# Patient Record
Sex: FEMALE | Race: WHITE | Employment: OTHER | URBAN - METROPOLITAN AREA
[De-identification: names, ages, dates, MRNs, and addresses within clinical notes are randomized per-mention and may not be internally consistent; named-entity substitution may affect disease eponyms.]

---

## 2017-03-02 ENCOUNTER — ALLSCRIPTS OFFICE VISIT (OUTPATIENT)
Dept: OTHER | Facility: OTHER | Age: 82
End: 2017-03-02

## 2017-03-04 ENCOUNTER — APPOINTMENT (EMERGENCY)
Dept: RADIOLOGY | Facility: HOSPITAL | Age: 82
DRG: 203 | End: 2017-03-04
Payer: MEDICARE

## 2017-03-04 ENCOUNTER — HOSPITAL ENCOUNTER (INPATIENT)
Facility: HOSPITAL | Age: 82
LOS: 8 days | Discharge: HOME HEALTH | DRG: 203 | End: 2017-03-12
Attending: EMERGENCY MEDICINE | Admitting: STUDENT IN AN ORGANIZED HEALTH CARE EDUCATION/TRAINING PROGRAM
Payer: MEDICARE

## 2017-03-04 DIAGNOSIS — J45.901 ASTHMA EXACERBATION: Primary | ICD-10-CM

## 2017-03-04 DIAGNOSIS — R09.02 HYPOXIA: ICD-10-CM

## 2017-03-04 PROBLEM — I10 HTN (HYPERTENSION): Status: ACTIVE | Noted: 2017-03-04

## 2017-03-04 PROBLEM — F32.A DEPRESSION: Status: ACTIVE | Noted: 2017-03-04

## 2017-03-04 LAB
ALBUMIN SERPL BCP-MCNC: 3.8 G/DL (ref 3.5–5)
ALP SERPL-CCNC: 79 U/L (ref 46–116)
ALT SERPL W P-5'-P-CCNC: 31 U/L (ref 12–78)
ANION GAP SERPL CALCULATED.3IONS-SCNC: 8 MMOL/L (ref 4–13)
APTT PPP: 30 SECONDS (ref 24–36)
AST SERPL W P-5'-P-CCNC: 26 U/L (ref 5–45)
BASOPHILS # BLD AUTO: 0 THOUSANDS/ΜL (ref 0–0.1)
BASOPHILS NFR BLD AUTO: 1 % (ref 0–1)
BILIRUB SERPL-MCNC: 0.5 MG/DL (ref 0.2–1)
BUN SERPL-MCNC: 12 MG/DL (ref 5–25)
CALCIUM SERPL-MCNC: 9.6 MG/DL (ref 8.3–10.1)
CHLORIDE SERPL-SCNC: 100 MMOL/L (ref 100–108)
CO2 SERPL-SCNC: 31 MMOL/L (ref 21–32)
CREAT SERPL-MCNC: 0.78 MG/DL (ref 0.6–1.3)
EOSINOPHIL # BLD AUTO: 0.1 THOUSAND/ΜL (ref 0–0.61)
EOSINOPHIL NFR BLD AUTO: 2 % (ref 0–6)
ERYTHROCYTE [DISTWIDTH] IN BLOOD BY AUTOMATED COUNT: 13.9 % (ref 11.6–15.1)
GFR SERPL CREATININE-BSD FRML MDRD: >60 ML/MIN/1.73SQ M
GLUCOSE SERPL-MCNC: 128 MG/DL (ref 65–140)
HCT VFR BLD AUTO: 47.3 % (ref 37–47)
HGB BLD-MCNC: 15.5 G/DL (ref 12–16)
INR PPP: 1.05 (ref 0.86–1.16)
LACTATE SERPL-SCNC: 1.4 MMOL/L (ref 0.5–2)
LYMPHOCYTES # BLD AUTO: 1.2 THOUSANDS/ΜL (ref 0.6–4.47)
LYMPHOCYTES NFR BLD AUTO: 21 % (ref 14–44)
MCH RBC QN AUTO: 29.7 PG (ref 27–31)
MCHC RBC AUTO-ENTMCNC: 32.7 G/DL (ref 31.4–37.4)
MCV RBC AUTO: 91 FL (ref 82–98)
MONOCYTES # BLD AUTO: 0.4 THOUSAND/ΜL (ref 0.17–1.22)
MONOCYTES NFR BLD AUTO: 7 % (ref 4–12)
NEUTROPHILS # BLD AUTO: 4 THOUSANDS/ΜL (ref 1.85–7.62)
NEUTS SEG NFR BLD AUTO: 69 % (ref 43–75)
NRBC BLD AUTO-RTO: 0 /100 WBCS
NT-PROBNP SERPL-MCNC: 135 PG/ML
PLATELET # BLD AUTO: 158 THOUSANDS/UL (ref 130–400)
PLATELET # BLD AUTO: 172 THOUSANDS/UL (ref 130–400)
PMV BLD AUTO: 7.5 FL (ref 8.9–12.7)
PMV BLD AUTO: 7.7 FL (ref 8.9–12.7)
POTASSIUM SERPL-SCNC: 3.7 MMOL/L (ref 3.5–5.3)
PROT SERPL-MCNC: 7.9 G/DL (ref 6.4–8.2)
PROTHROMBIN TIME: 11 SECONDS (ref 9.4–11.7)
RBC # BLD AUTO: 5.22 MILLION/UL (ref 4.2–5.4)
SODIUM SERPL-SCNC: 139 MMOL/L (ref 136–145)
TROPONIN I SERPL-MCNC: <0.02 NG/ML
TROPONIN I SERPL-MCNC: <0.02 NG/ML
WBC # BLD AUTO: 5.8 THOUSAND/UL (ref 4.8–10.8)

## 2017-03-04 PROCEDURE — 93005 ELECTROCARDIOGRAM TRACING: CPT | Performed by: EMERGENCY MEDICINE

## 2017-03-04 PROCEDURE — 84484 ASSAY OF TROPONIN QUANT: CPT | Performed by: EMERGENCY MEDICINE

## 2017-03-04 PROCEDURE — 80053 COMPREHEN METABOLIC PANEL: CPT | Performed by: EMERGENCY MEDICINE

## 2017-03-04 PROCEDURE — 96375 TX/PRO/DX INJ NEW DRUG ADDON: CPT

## 2017-03-04 PROCEDURE — 85049 AUTOMATED PLATELET COUNT: CPT | Performed by: STUDENT IN AN ORGANIZED HEALTH CARE EDUCATION/TRAINING PROGRAM

## 2017-03-04 PROCEDURE — 87798 DETECT AGENT NOS DNA AMP: CPT | Performed by: EMERGENCY MEDICINE

## 2017-03-04 PROCEDURE — 83880 ASSAY OF NATRIURETIC PEPTIDE: CPT | Performed by: EMERGENCY MEDICINE

## 2017-03-04 PROCEDURE — 85730 THROMBOPLASTIN TIME PARTIAL: CPT | Performed by: EMERGENCY MEDICINE

## 2017-03-04 PROCEDURE — 87040 BLOOD CULTURE FOR BACTERIA: CPT | Performed by: EMERGENCY MEDICINE

## 2017-03-04 PROCEDURE — 83605 ASSAY OF LACTIC ACID: CPT | Performed by: EMERGENCY MEDICINE

## 2017-03-04 PROCEDURE — 94640 AIRWAY INHALATION TREATMENT: CPT

## 2017-03-04 PROCEDURE — 36415 COLL VENOUS BLD VENIPUNCTURE: CPT | Performed by: EMERGENCY MEDICINE

## 2017-03-04 PROCEDURE — 99285 EMERGENCY DEPT VISIT HI MDM: CPT

## 2017-03-04 PROCEDURE — 94760 N-INVAS EAR/PLS OXIMETRY 1: CPT

## 2017-03-04 PROCEDURE — 84484 ASSAY OF TROPONIN QUANT: CPT | Performed by: STUDENT IN AN ORGANIZED HEALTH CARE EDUCATION/TRAINING PROGRAM

## 2017-03-04 PROCEDURE — 85025 COMPLETE CBC W/AUTO DIFF WBC: CPT | Performed by: EMERGENCY MEDICINE

## 2017-03-04 PROCEDURE — 96365 THER/PROPH/DIAG IV INF INIT: CPT

## 2017-03-04 PROCEDURE — 94664 DEMO&/EVAL PT USE INHALER: CPT

## 2017-03-04 PROCEDURE — 85610 PROTHROMBIN TIME: CPT | Performed by: EMERGENCY MEDICINE

## 2017-03-04 PROCEDURE — 87081 CULTURE SCREEN ONLY: CPT | Performed by: STUDENT IN AN ORGANIZED HEALTH CARE EDUCATION/TRAINING PROGRAM

## 2017-03-04 PROCEDURE — 71010 HB CHEST X-RAY 1 VIEW FRONTAL (PORTABLE): CPT

## 2017-03-04 RX ORDER — LANOLIN ALCOHOL/MO/W.PET/CERES
400 CREAM (GRAM) TOPICAL DAILY
Status: DISCONTINUED | OUTPATIENT
Start: 2017-03-05 | End: 2017-03-12 | Stop reason: HOSPADM

## 2017-03-04 RX ORDER — METHYLPREDNISOLONE SODIUM SUCCINATE 125 MG/2ML
125 INJECTION, POWDER, LYOPHILIZED, FOR SOLUTION INTRAMUSCULAR; INTRAVENOUS ONCE
Status: COMPLETED | OUTPATIENT
Start: 2017-03-04 | End: 2017-03-04

## 2017-03-04 RX ORDER — AZITHROMYCIN 250 MG/1
500 TABLET, FILM COATED ORAL EVERY 24 HOURS
Status: DISCONTINUED | OUTPATIENT
Start: 2017-03-05 | End: 2017-03-09

## 2017-03-04 RX ORDER — METHYLPREDNISOLONE SODIUM SUCCINATE 125 MG/2ML
60 INJECTION, POWDER, LYOPHILIZED, FOR SOLUTION INTRAMUSCULAR; INTRAVENOUS EVERY 8 HOURS
Status: DISCONTINUED | OUTPATIENT
Start: 2017-03-05 | End: 2017-03-06

## 2017-03-04 RX ORDER — LEVALBUTEROL INHALATION SOLUTION 0.63 MG/3ML
0.63 SOLUTION RESPIRATORY (INHALATION) EVERY 8 HOURS PRN
Status: DISCONTINUED | OUTPATIENT
Start: 2017-03-04 | End: 2017-03-05

## 2017-03-04 RX ORDER — SERTRALINE HYDROCHLORIDE 25 MG/1
25 TABLET, FILM COATED ORAL DAILY
Status: DISCONTINUED | OUTPATIENT
Start: 2017-03-05 | End: 2017-03-12 | Stop reason: HOSPADM

## 2017-03-04 RX ORDER — HEPARIN SODIUM 5000 [USP'U]/ML
5000 INJECTION, SOLUTION INTRAVENOUS; SUBCUTANEOUS EVERY 8 HOURS SCHEDULED
Status: DISCONTINUED | OUTPATIENT
Start: 2017-03-04 | End: 2017-03-12 | Stop reason: HOSPADM

## 2017-03-04 RX ORDER — AMLODIPINE BESYLATE 5 MG/1
5 TABLET ORAL DAILY
Status: DISCONTINUED | OUTPATIENT
Start: 2017-03-05 | End: 2017-03-12 | Stop reason: HOSPADM

## 2017-03-04 RX ORDER — VALSARTAN 80 MG/1
80 TABLET ORAL 2 TIMES DAILY
Status: DISCONTINUED | OUTPATIENT
Start: 2017-03-04 | End: 2017-03-12 | Stop reason: HOSPADM

## 2017-03-04 RX ORDER — IPRATROPIUM BROMIDE AND ALBUTEROL SULFATE 2.5; .5 MG/3ML; MG/3ML
3 SOLUTION RESPIRATORY (INHALATION)
Status: DISCONTINUED | OUTPATIENT
Start: 2017-03-04 | End: 2017-03-05

## 2017-03-04 RX ORDER — MONTELUKAST SODIUM 10 MG/1
10 TABLET ORAL
Status: DISCONTINUED | OUTPATIENT
Start: 2017-03-04 | End: 2017-03-12 | Stop reason: HOSPADM

## 2017-03-04 RX ORDER — FLUTICASONE PROPIONATE 50 MCG
1 SPRAY, SUSPENSION (ML) NASAL DAILY
Status: DISCONTINUED | OUTPATIENT
Start: 2017-03-05 | End: 2017-03-12 | Stop reason: HOSPADM

## 2017-03-04 RX ORDER — LEVALBUTEROL INHALATION SOLUTION 0.31 MG/3ML
1 SOLUTION RESPIRATORY (INHALATION) EVERY 4 HOURS PRN
COMMUNITY
End: 2017-03-12 | Stop reason: HOSPADM

## 2017-03-04 RX ORDER — IPRATROPIUM BROMIDE AND ALBUTEROL SULFATE 2.5; .5 MG/3ML; MG/3ML
3 SOLUTION RESPIRATORY (INHALATION) ONCE
Status: COMPLETED | OUTPATIENT
Start: 2017-03-04 | End: 2017-03-04

## 2017-03-04 RX ADMIN — VALSARTAN 80 MG: 80 TABLET ORAL at 21:32

## 2017-03-04 RX ADMIN — METHYLPREDNISOLONE SODIUM SUCCINATE 125 MG: 125 INJECTION, POWDER, FOR SOLUTION INTRAMUSCULAR; INTRAVENOUS at 18:19

## 2017-03-04 RX ADMIN — IPRATROPIUM BROMIDE AND ALBUTEROL SULFATE 3 ML: .5; 3 SOLUTION RESPIRATORY (INHALATION) at 21:58

## 2017-03-04 RX ADMIN — CEFTRIAXONE 1000 MG: 1 INJECTION, SOLUTION INTRAVENOUS at 19:09

## 2017-03-04 RX ADMIN — MONTELUKAST SODIUM 10 MG: 10 TABLET, COATED ORAL at 21:32

## 2017-03-04 RX ADMIN — AZITHROMYCIN MONOHYDRATE 500 MG: 500 INJECTION, POWDER, LYOPHILIZED, FOR SOLUTION INTRAVENOUS at 19:47

## 2017-03-04 RX ADMIN — IPRATROPIUM BROMIDE AND ALBUTEROL SULFATE 3 ML: .5; 3 SOLUTION RESPIRATORY (INHALATION) at 18:19

## 2017-03-04 RX ADMIN — HEPARIN SODIUM 5000 UNITS: 5000 INJECTION, SOLUTION INTRAVENOUS; SUBCUTANEOUS at 21:33

## 2017-03-05 LAB
ANION GAP SERPL CALCULATED.3IONS-SCNC: 7 MMOL/L (ref 4–13)
BUN SERPL-MCNC: 15 MG/DL (ref 5–25)
CALCIUM SERPL-MCNC: 8.8 MG/DL (ref 8.3–10.1)
CHLORIDE SERPL-SCNC: 103 MMOL/L (ref 100–108)
CO2 SERPL-SCNC: 32 MMOL/L (ref 21–32)
CREAT SERPL-MCNC: 0.58 MG/DL (ref 0.6–1.3)
ERYTHROCYTE [DISTWIDTH] IN BLOOD BY AUTOMATED COUNT: 14.1 % (ref 11.6–15.1)
FLUAV AG SPEC QL: NORMAL
FLUBV AG SPEC QL: NORMAL
GFR SERPL CREATININE-BSD FRML MDRD: >60 ML/MIN/1.73SQ M
GLUCOSE SERPL-MCNC: 165 MG/DL (ref 65–140)
HCT VFR BLD AUTO: 43.7 % (ref 37–47)
HGB BLD-MCNC: 14.4 G/DL (ref 12–16)
MCH RBC QN AUTO: 29.8 PG (ref 27–31)
MCHC RBC AUTO-ENTMCNC: 32.9 G/DL (ref 31.4–37.4)
MCV RBC AUTO: 91 FL (ref 82–98)
PLATELET # BLD AUTO: 151 THOUSANDS/UL (ref 130–400)
PMV BLD AUTO: 8.1 FL (ref 8.9–12.7)
POTASSIUM SERPL-SCNC: 4 MMOL/L (ref 3.5–5.3)
RBC # BLD AUTO: 4.82 MILLION/UL (ref 4.2–5.4)
RSV B RNA SPEC QL NAA+PROBE: NORMAL
SODIUM SERPL-SCNC: 142 MMOL/L (ref 136–145)
TROPONIN I SERPL-MCNC: <0.02 NG/ML
WBC # BLD AUTO: 3.5 THOUSAND/UL (ref 4.8–10.8)

## 2017-03-05 PROCEDURE — 80048 BASIC METABOLIC PNL TOTAL CA: CPT | Performed by: STUDENT IN AN ORGANIZED HEALTH CARE EDUCATION/TRAINING PROGRAM

## 2017-03-05 PROCEDURE — 94640 AIRWAY INHALATION TREATMENT: CPT

## 2017-03-05 PROCEDURE — 87449 NOS EACH ORGANISM AG IA: CPT | Performed by: FAMILY MEDICINE

## 2017-03-05 PROCEDURE — 85027 COMPLETE CBC AUTOMATED: CPT | Performed by: STUDENT IN AN ORGANIZED HEALTH CARE EDUCATION/TRAINING PROGRAM

## 2017-03-05 PROCEDURE — 94760 N-INVAS EAR/PLS OXIMETRY 1: CPT

## 2017-03-05 PROCEDURE — 94150 VITAL CAPACITY TEST: CPT

## 2017-03-05 PROCEDURE — 84484 ASSAY OF TROPONIN QUANT: CPT | Performed by: STUDENT IN AN ORGANIZED HEALTH CARE EDUCATION/TRAINING PROGRAM

## 2017-03-05 RX ORDER — LEVALBUTEROL 1.25 MG/.5ML
1.25 SOLUTION, CONCENTRATE RESPIRATORY (INHALATION)
Status: DISCONTINUED | OUTPATIENT
Start: 2017-03-05 | End: 2017-03-12 | Stop reason: HOSPADM

## 2017-03-05 RX ORDER — LEVALBUTEROL 1.25 MG/.5ML
1.25 SOLUTION, CONCENTRATE RESPIRATORY (INHALATION) EVERY 4 HOURS PRN
Status: DISCONTINUED | OUTPATIENT
Start: 2017-03-05 | End: 2017-03-12 | Stop reason: HOSPADM

## 2017-03-05 RX ADMIN — VALSARTAN 80 MG: 80 TABLET ORAL at 21:25

## 2017-03-05 RX ADMIN — AZITHROMYCIN 500 MG: 250 TABLET, FILM COATED ORAL at 17:08

## 2017-03-05 RX ADMIN — MENTHOL 5.4 MG: 5.4 LOZENGE ORAL at 10:04

## 2017-03-05 RX ADMIN — IPRATROPIUM BROMIDE AND ALBUTEROL SULFATE 3 ML: .5; 3 SOLUTION RESPIRATORY (INHALATION) at 08:12

## 2017-03-05 RX ADMIN — HEPARIN SODIUM 5000 UNITS: 5000 INJECTION, SOLUTION INTRAVENOUS; SUBCUTANEOUS at 06:44

## 2017-03-05 RX ADMIN — LEVALBUTEROL HYDROCHLORIDE 1.25 MG: 1.25 SOLUTION, CONCENTRATE RESPIRATORY (INHALATION) at 15:08

## 2017-03-05 RX ADMIN — METHYLPREDNISOLONE SODIUM SUCCINATE 60 MG: 125 INJECTION, POWDER, FOR SOLUTION INTRAMUSCULAR; INTRAVENOUS at 17:09

## 2017-03-05 RX ADMIN — IPRATROPIUM BROMIDE 0.5 MG: 0.5 SOLUTION RESPIRATORY (INHALATION) at 20:39

## 2017-03-05 RX ADMIN — FLUTICASONE PROPIONATE 1 SPRAY: 50 SPRAY, METERED NASAL at 08:17

## 2017-03-05 RX ADMIN — LEVALBUTEROL HYDROCHLORIDE 1.25 MG: 1.25 SOLUTION, CONCENTRATE RESPIRATORY (INHALATION) at 20:39

## 2017-03-05 RX ADMIN — IPRATROPIUM BROMIDE 0.5 MG: 0.5 SOLUTION RESPIRATORY (INHALATION) at 15:08

## 2017-03-05 RX ADMIN — VALSARTAN 80 MG: 80 TABLET ORAL at 08:17

## 2017-03-05 RX ADMIN — AMLODIPINE BESYLATE 5 MG: 5 TABLET ORAL at 08:17

## 2017-03-05 RX ADMIN — SERTRALINE 25 MG: 25 TABLET, FILM COATED ORAL at 08:17

## 2017-03-05 RX ADMIN — MONTELUKAST SODIUM 10 MG: 10 TABLET, COATED ORAL at 21:25

## 2017-03-05 RX ADMIN — METHYLPREDNISOLONE SODIUM SUCCINATE 60 MG: 125 INJECTION, POWDER, FOR SOLUTION INTRAMUSCULAR; INTRAVENOUS at 10:03

## 2017-03-05 RX ADMIN — MENTHOL 5.4 MG: 5.4 LOZENGE ORAL at 21:54

## 2017-03-05 RX ADMIN — IPRATROPIUM BROMIDE AND ALBUTEROL SULFATE 3 ML: .5; 3 SOLUTION RESPIRATORY (INHALATION) at 02:49

## 2017-03-05 RX ADMIN — METHYLPREDNISOLONE SODIUM SUCCINATE 60 MG: 125 INJECTION, POWDER, FOR SOLUTION INTRAMUSCULAR; INTRAVENOUS at 02:50

## 2017-03-05 RX ADMIN — HEPARIN SODIUM 5000 UNITS: 5000 INJECTION, SOLUTION INTRAVENOUS; SUBCUTANEOUS at 21:25

## 2017-03-05 RX ADMIN — Medication 400 MCG: at 08:17

## 2017-03-05 RX ADMIN — HEPARIN SODIUM 5000 UNITS: 5000 INJECTION, SOLUTION INTRAVENOUS; SUBCUTANEOUS at 14:01

## 2017-03-06 ENCOUNTER — APPOINTMENT (INPATIENT)
Dept: OCCUPATIONAL THERAPY | Facility: HOSPITAL | Age: 82
DRG: 203 | End: 2017-03-06
Payer: MEDICARE

## 2017-03-06 ENCOUNTER — APPOINTMENT (INPATIENT)
Dept: PHYSICAL THERAPY | Facility: HOSPITAL | Age: 82
DRG: 203 | End: 2017-03-06
Payer: MEDICARE

## 2017-03-06 LAB
ANION GAP SERPL CALCULATED.3IONS-SCNC: 8 MMOL/L (ref 4–13)
ATRIAL RATE: 108 BPM
BUN SERPL-MCNC: 22 MG/DL (ref 5–25)
CALCIUM SERPL-MCNC: 8.8 MG/DL (ref 8.3–10.1)
CHLORIDE SERPL-SCNC: 103 MMOL/L (ref 100–108)
CO2 SERPL-SCNC: 30 MMOL/L (ref 21–32)
CREAT SERPL-MCNC: 0.69 MG/DL (ref 0.6–1.3)
ERYTHROCYTE [DISTWIDTH] IN BLOOD BY AUTOMATED COUNT: 14.4 % (ref 11.6–15.1)
GFR SERPL CREATININE-BSD FRML MDRD: >60 ML/MIN/1.73SQ M
GLUCOSE SERPL-MCNC: 168 MG/DL (ref 65–140)
HCT VFR BLD AUTO: 45 % (ref 37–47)
HGB BLD-MCNC: 14.6 G/DL (ref 12–16)
L PNEUMO1 AG UR QL IA.RAPID: NEGATIVE
MCH RBC QN AUTO: 29.8 PG (ref 27–31)
MCHC RBC AUTO-ENTMCNC: 32.5 G/DL (ref 31.4–37.4)
MCV RBC AUTO: 92 FL (ref 82–98)
MRSA NOSE QL CULT: NORMAL
P AXIS: 78 DEGREES
PLATELET # BLD AUTO: 192 THOUSANDS/UL (ref 130–400)
PMV BLD AUTO: 7.9 FL (ref 8.9–12.7)
POTASSIUM SERPL-SCNC: 4 MMOL/L (ref 3.5–5.3)
PR INTERVAL: 156 MS
QRS AXIS: 53 DEGREES
QRSD INTERVAL: 80 MS
QT INTERVAL: 348 MS
QTC INTERVAL: 466 MS
RBC # BLD AUTO: 4.91 MILLION/UL (ref 4.2–5.4)
S PNEUM AG UR QL: NEGATIVE
SODIUM SERPL-SCNC: 141 MMOL/L (ref 136–145)
T WAVE AXIS: 67 DEGREES
VENTRICULAR RATE: 108 BPM
WBC # BLD AUTO: 11.2 THOUSAND/UL (ref 4.8–10.8)

## 2017-03-06 PROCEDURE — G8988 SELF CARE GOAL STATUS: HCPCS

## 2017-03-06 PROCEDURE — G8987 SELF CARE CURRENT STATUS: HCPCS

## 2017-03-06 PROCEDURE — 80048 BASIC METABOLIC PNL TOTAL CA: CPT | Performed by: FAMILY MEDICINE

## 2017-03-06 PROCEDURE — 97166 OT EVAL MOD COMPLEX 45 MIN: CPT

## 2017-03-06 PROCEDURE — G8979 MOBILITY GOAL STATUS: HCPCS

## 2017-03-06 PROCEDURE — 85027 COMPLETE CBC AUTOMATED: CPT | Performed by: FAMILY MEDICINE

## 2017-03-06 PROCEDURE — 94640 AIRWAY INHALATION TREATMENT: CPT

## 2017-03-06 PROCEDURE — 97110 THERAPEUTIC EXERCISES: CPT

## 2017-03-06 PROCEDURE — 97535 SELF CARE MNGMENT TRAINING: CPT

## 2017-03-06 PROCEDURE — 94150 VITAL CAPACITY TEST: CPT

## 2017-03-06 PROCEDURE — G8978 MOBILITY CURRENT STATUS: HCPCS

## 2017-03-06 PROCEDURE — 94760 N-INVAS EAR/PLS OXIMETRY 1: CPT

## 2017-03-06 PROCEDURE — 97162 PT EVAL MOD COMPLEX 30 MIN: CPT

## 2017-03-06 RX ORDER — METHYLPREDNISOLONE SODIUM SUCCINATE 40 MG/ML
40 INJECTION, POWDER, LYOPHILIZED, FOR SOLUTION INTRAMUSCULAR; INTRAVENOUS EVERY 8 HOURS
Status: DISCONTINUED | OUTPATIENT
Start: 2017-03-06 | End: 2017-03-09

## 2017-03-06 RX ADMIN — AZITHROMYCIN 500 MG: 250 TABLET, FILM COATED ORAL at 16:44

## 2017-03-06 RX ADMIN — LEVALBUTEROL HYDROCHLORIDE 1.25 MG: 1.25 SOLUTION, CONCENTRATE RESPIRATORY (INHALATION) at 07:26

## 2017-03-06 RX ADMIN — HEPARIN SODIUM 5000 UNITS: 5000 INJECTION, SOLUTION INTRAVENOUS; SUBCUTANEOUS at 05:27

## 2017-03-06 RX ADMIN — Medication 400 MCG: at 09:14

## 2017-03-06 RX ADMIN — HEPARIN SODIUM 5000 UNITS: 5000 INJECTION, SOLUTION INTRAVENOUS; SUBCUTANEOUS at 21:11

## 2017-03-06 RX ADMIN — LEVALBUTEROL HYDROCHLORIDE 1.25 MG: 1.25 SOLUTION, CONCENTRATE RESPIRATORY (INHALATION) at 11:24

## 2017-03-06 RX ADMIN — LEVALBUTEROL 1.25 MG: 1.25 SOLUTION, CONCENTRATE RESPIRATORY (INHALATION) at 03:25

## 2017-03-06 RX ADMIN — MONTELUKAST SODIUM 10 MG: 10 TABLET, COATED ORAL at 21:11

## 2017-03-06 RX ADMIN — SERTRALINE 25 MG: 25 TABLET, FILM COATED ORAL at 09:14

## 2017-03-06 RX ADMIN — IPRATROPIUM BROMIDE 0.5 MG: 0.5 SOLUTION RESPIRATORY (INHALATION) at 11:23

## 2017-03-06 RX ADMIN — METHYLPREDNISOLONE SODIUM SUCCINATE 60 MG: 125 INJECTION, POWDER, FOR SOLUTION INTRAMUSCULAR; INTRAVENOUS at 09:12

## 2017-03-06 RX ADMIN — METHYLPREDNISOLONE SODIUM SUCCINATE 60 MG: 125 INJECTION, POWDER, FOR SOLUTION INTRAMUSCULAR; INTRAVENOUS at 02:04

## 2017-03-06 RX ADMIN — IPRATROPIUM BROMIDE 0.5 MG: 0.5 SOLUTION RESPIRATORY (INHALATION) at 03:24

## 2017-03-06 RX ADMIN — IPRATROPIUM BROMIDE 0.5 MG: 0.5 SOLUTION RESPIRATORY (INHALATION) at 15:17

## 2017-03-06 RX ADMIN — MENTHOL 5.4 MG: 5.4 LOZENGE ORAL at 21:48

## 2017-03-06 RX ADMIN — MENTHOL 5.4 MG: 5.4 LOZENGE ORAL at 16:44

## 2017-03-06 RX ADMIN — IPRATROPIUM BROMIDE 0.5 MG: 0.5 SOLUTION RESPIRATORY (INHALATION) at 07:26

## 2017-03-06 RX ADMIN — IPRATROPIUM BROMIDE 0.5 MG: 0.5 SOLUTION RESPIRATORY (INHALATION) at 21:29

## 2017-03-06 RX ADMIN — AMLODIPINE BESYLATE 5 MG: 5 TABLET ORAL at 09:14

## 2017-03-06 RX ADMIN — HEPARIN SODIUM 5000 UNITS: 5000 INJECTION, SOLUTION INTRAVENOUS; SUBCUTANEOUS at 14:03

## 2017-03-06 RX ADMIN — LEVALBUTEROL HYDROCHLORIDE 1.25 MG: 1.25 SOLUTION, CONCENTRATE RESPIRATORY (INHALATION) at 15:17

## 2017-03-06 RX ADMIN — FLUTICASONE PROPIONATE 1 SPRAY: 50 SPRAY, METERED NASAL at 09:14

## 2017-03-06 RX ADMIN — VALSARTAN 80 MG: 80 TABLET ORAL at 09:14

## 2017-03-06 RX ADMIN — VALSARTAN 80 MG: 80 TABLET ORAL at 21:11

## 2017-03-06 RX ADMIN — MENTHOL 5.4 MG: 5.4 LOZENGE ORAL at 09:14

## 2017-03-06 RX ADMIN — METHYLPREDNISOLONE SODIUM SUCCINATE 40 MG: 40 INJECTION, POWDER, FOR SOLUTION INTRAMUSCULAR; INTRAVENOUS at 17:50

## 2017-03-06 RX ADMIN — LEVALBUTEROL HYDROCHLORIDE 1.25 MG: 1.25 SOLUTION, CONCENTRATE RESPIRATORY (INHALATION) at 21:29

## 2017-03-07 ENCOUNTER — APPOINTMENT (INPATIENT)
Dept: PHYSICAL THERAPY | Facility: HOSPITAL | Age: 82
DRG: 203 | End: 2017-03-07
Payer: MEDICARE

## 2017-03-07 LAB
ANION GAP SERPL CALCULATED.3IONS-SCNC: 6 MMOL/L (ref 4–13)
BUN SERPL-MCNC: 22 MG/DL (ref 5–25)
CALCIUM SERPL-MCNC: 8.7 MG/DL (ref 8.3–10.1)
CHLORIDE SERPL-SCNC: 102 MMOL/L (ref 100–108)
CO2 SERPL-SCNC: 31 MMOL/L (ref 21–32)
CREAT SERPL-MCNC: 0.6 MG/DL (ref 0.6–1.3)
ERYTHROCYTE [DISTWIDTH] IN BLOOD BY AUTOMATED COUNT: 14.2 % (ref 11.6–15.1)
GFR SERPL CREATININE-BSD FRML MDRD: >60 ML/MIN/1.73SQ M
GLUCOSE SERPL-MCNC: 143 MG/DL (ref 65–140)
HCT VFR BLD AUTO: 43.3 % (ref 37–47)
HGB BLD-MCNC: 14.2 G/DL (ref 12–16)
MCH RBC QN AUTO: 29.9 PG (ref 27–31)
MCHC RBC AUTO-ENTMCNC: 32.9 G/DL (ref 31.4–37.4)
MCV RBC AUTO: 91 FL (ref 82–98)
PLATELET # BLD AUTO: 192 THOUSANDS/UL (ref 130–400)
PMV BLD AUTO: 8.4 FL (ref 8.9–12.7)
POTASSIUM SERPL-SCNC: 3.9 MMOL/L (ref 3.5–5.3)
RBC # BLD AUTO: 4.76 MILLION/UL (ref 4.2–5.4)
SODIUM SERPL-SCNC: 139 MMOL/L (ref 136–145)
WBC # BLD AUTO: 12.6 THOUSAND/UL (ref 4.8–10.8)

## 2017-03-07 PROCEDURE — 97110 THERAPEUTIC EXERCISES: CPT

## 2017-03-07 PROCEDURE — 80048 BASIC METABOLIC PNL TOTAL CA: CPT | Performed by: FAMILY MEDICINE

## 2017-03-07 PROCEDURE — 94668 MNPJ CHEST WALL SBSQ: CPT

## 2017-03-07 PROCEDURE — 94760 N-INVAS EAR/PLS OXIMETRY 1: CPT

## 2017-03-07 PROCEDURE — 94640 AIRWAY INHALATION TREATMENT: CPT

## 2017-03-07 PROCEDURE — 85027 COMPLETE CBC AUTOMATED: CPT | Performed by: FAMILY MEDICINE

## 2017-03-07 RX ORDER — METHYLPREDNISOLONE SODIUM SUCCINATE 40 MG/ML
INJECTION, POWDER, LYOPHILIZED, FOR SOLUTION INTRAMUSCULAR; INTRAVENOUS
Status: COMPLETED
Start: 2017-03-07 | End: 2017-03-07

## 2017-03-07 RX ORDER — HEPARIN SODIUM 5000 [USP'U]/ML
INJECTION, SOLUTION INTRAVENOUS; SUBCUTANEOUS
Status: COMPLETED
Start: 2017-03-07 | End: 2017-03-07

## 2017-03-07 RX ORDER — VALSARTAN 80 MG/1
TABLET ORAL
Status: COMPLETED
Start: 2017-03-07 | End: 2017-03-07

## 2017-03-07 RX ORDER — SERTRALINE HYDROCHLORIDE 25 MG/1
TABLET, FILM COATED ORAL
Status: COMPLETED
Start: 2017-03-07 | End: 2017-03-07

## 2017-03-07 RX ORDER — LANOLIN ALCOHOL/MO/W.PET/CERES
CREAM (GRAM) TOPICAL
Status: COMPLETED
Start: 2017-03-07 | End: 2017-03-07

## 2017-03-07 RX ORDER — AMLODIPINE BESYLATE 5 MG/1
TABLET ORAL
Status: COMPLETED
Start: 2017-03-07 | End: 2017-03-07

## 2017-03-07 RX ADMIN — MONTELUKAST SODIUM 10 MG: 10 TABLET, COATED ORAL at 21:35

## 2017-03-07 RX ADMIN — SERTRALINE HYDROCHLORIDE 25 MG: 25 TABLET ORAL at 08:29

## 2017-03-07 RX ADMIN — MENTHOL 5.4 MG: 5.4 LOZENGE ORAL at 23:57

## 2017-03-07 RX ADMIN — HEPARIN SODIUM 5000 UNITS: 5000 INJECTION, SOLUTION INTRAVENOUS; SUBCUTANEOUS at 15:02

## 2017-03-07 RX ADMIN — METHYLPREDNISOLONE SODIUM SUCCINATE 40 MG: 40 INJECTION, POWDER, FOR SOLUTION INTRAMUSCULAR; INTRAVENOUS at 08:29

## 2017-03-07 RX ADMIN — VALSARTAN 80 MG: 80 TABLET ORAL at 21:35

## 2017-03-07 RX ADMIN — IPRATROPIUM BROMIDE 0.5 MG: 0.5 SOLUTION RESPIRATORY (INHALATION) at 11:36

## 2017-03-07 RX ADMIN — HEPARIN SODIUM 5000 UNITS: 5000 INJECTION, SOLUTION INTRAVENOUS; SUBCUTANEOUS at 05:25

## 2017-03-07 RX ADMIN — Medication 400 MCG: at 08:28

## 2017-03-07 RX ADMIN — IPRATROPIUM BROMIDE 0.5 MG: 0.5 SOLUTION RESPIRATORY (INHALATION) at 08:40

## 2017-03-07 RX ADMIN — IPRATROPIUM BROMIDE 0.5 MG: 0.5 SOLUTION RESPIRATORY (INHALATION) at 19:38

## 2017-03-07 RX ADMIN — LEVALBUTEROL HYDROCHLORIDE 1.25 MG: 1.25 SOLUTION, CONCENTRATE RESPIRATORY (INHALATION) at 11:35

## 2017-03-07 RX ADMIN — LEVALBUTEROL HYDROCHLORIDE 1.25 MG: 1.25 SOLUTION, CONCENTRATE RESPIRATORY (INHALATION) at 19:38

## 2017-03-07 RX ADMIN — LEVALBUTEROL HYDROCHLORIDE 1.25 MG: 1.25 SOLUTION, CONCENTRATE RESPIRATORY (INHALATION) at 08:40

## 2017-03-07 RX ADMIN — FLUTICASONE PROPIONATE 1 SPRAY: 50 SPRAY, METERED NASAL at 08:27

## 2017-03-07 RX ADMIN — AMLODIPINE BESYLATE 5 MG: 5 TABLET ORAL at 08:28

## 2017-03-07 RX ADMIN — AZITHROMYCIN 500 MG: 250 TABLET, FILM COATED ORAL at 15:02

## 2017-03-07 RX ADMIN — SERTRALINE 25 MG: 25 TABLET, FILM COATED ORAL at 08:29

## 2017-03-07 RX ADMIN — MENTHOL 5.4 MG: 5.4 LOZENGE ORAL at 10:38

## 2017-03-07 RX ADMIN — IPRATROPIUM BROMIDE 0.5 MG: 0.5 SOLUTION RESPIRATORY (INHALATION) at 15:29

## 2017-03-07 RX ADMIN — VALSARTAN 80 MG: 80 TABLET ORAL at 08:28

## 2017-03-07 RX ADMIN — METHYLPREDNISOLONE SODIUM SUCCINATE 40 MG: 40 INJECTION, POWDER, FOR SOLUTION INTRAMUSCULAR; INTRAVENOUS at 02:00

## 2017-03-07 RX ADMIN — METHYLPREDNISOLONE SODIUM SUCCINATE 40 MG: 40 INJECTION, POWDER, FOR SOLUTION INTRAMUSCULAR; INTRAVENOUS at 17:26

## 2017-03-07 RX ADMIN — LEVALBUTEROL HYDROCHLORIDE 1.25 MG: 1.25 SOLUTION, CONCENTRATE RESPIRATORY (INHALATION) at 15:29

## 2017-03-07 RX ADMIN — HEPARIN SODIUM 5000 UNITS: 5000 INJECTION, SOLUTION INTRAVENOUS; SUBCUTANEOUS at 21:36

## 2017-03-08 ENCOUNTER — APPOINTMENT (INPATIENT)
Dept: PHYSICAL THERAPY | Facility: HOSPITAL | Age: 82
DRG: 203 | End: 2017-03-08
Payer: MEDICARE

## 2017-03-08 PROCEDURE — 94640 AIRWAY INHALATION TREATMENT: CPT

## 2017-03-08 PROCEDURE — 94150 VITAL CAPACITY TEST: CPT

## 2017-03-08 PROCEDURE — 97535 SELF CARE MNGMENT TRAINING: CPT

## 2017-03-08 PROCEDURE — 97110 THERAPEUTIC EXERCISES: CPT

## 2017-03-08 PROCEDURE — 94760 N-INVAS EAR/PLS OXIMETRY 1: CPT

## 2017-03-08 PROCEDURE — 94668 MNPJ CHEST WALL SBSQ: CPT

## 2017-03-08 RX ADMIN — MENTHOL 5.4 MG: 5.4 LOZENGE ORAL at 11:46

## 2017-03-08 RX ADMIN — LEVALBUTEROL HYDROCHLORIDE 1.25 MG: 1.25 SOLUTION, CONCENTRATE RESPIRATORY (INHALATION) at 20:18

## 2017-03-08 RX ADMIN — LEVALBUTEROL HYDROCHLORIDE 1.25 MG: 1.25 SOLUTION, CONCENTRATE RESPIRATORY (INHALATION) at 08:20

## 2017-03-08 RX ADMIN — IPRATROPIUM BROMIDE 0.5 MG: 0.5 SOLUTION RESPIRATORY (INHALATION) at 11:30

## 2017-03-08 RX ADMIN — HEPARIN SODIUM 5000 UNITS: 5000 INJECTION, SOLUTION INTRAVENOUS; SUBCUTANEOUS at 05:42

## 2017-03-08 RX ADMIN — LEVALBUTEROL HYDROCHLORIDE 1.25 MG: 1.25 SOLUTION, CONCENTRATE RESPIRATORY (INHALATION) at 11:30

## 2017-03-08 RX ADMIN — FLUTICASONE PROPIONATE 1 SPRAY: 50 SPRAY, METERED NASAL at 08:32

## 2017-03-08 RX ADMIN — METHYLPREDNISOLONE SODIUM SUCCINATE 40 MG: 40 INJECTION, POWDER, FOR SOLUTION INTRAMUSCULAR; INTRAVENOUS at 02:19

## 2017-03-08 RX ADMIN — Medication 400 MCG: at 08:32

## 2017-03-08 RX ADMIN — MONTELUKAST SODIUM 10 MG: 10 TABLET, COATED ORAL at 21:46

## 2017-03-08 RX ADMIN — MENTHOL 5.4 MG: 5.4 LOZENGE ORAL at 21:46

## 2017-03-08 RX ADMIN — AMLODIPINE BESYLATE 5 MG: 5 TABLET ORAL at 08:32

## 2017-03-08 RX ADMIN — HEPARIN SODIUM 5000 UNITS: 5000 INJECTION, SOLUTION INTRAVENOUS; SUBCUTANEOUS at 21:46

## 2017-03-08 RX ADMIN — AZITHROMYCIN 500 MG: 250 TABLET, FILM COATED ORAL at 15:06

## 2017-03-08 RX ADMIN — IPRATROPIUM BROMIDE 0.5 MG: 0.5 SOLUTION RESPIRATORY (INHALATION) at 08:20

## 2017-03-08 RX ADMIN — METHYLPREDNISOLONE SODIUM SUCCINATE 40 MG: 40 INJECTION, POWDER, FOR SOLUTION INTRAMUSCULAR; INTRAVENOUS at 10:25

## 2017-03-08 RX ADMIN — HEPARIN SODIUM 5000 UNITS: 5000 INJECTION, SOLUTION INTRAVENOUS; SUBCUTANEOUS at 15:06

## 2017-03-08 RX ADMIN — LEVALBUTEROL HYDROCHLORIDE 1.25 MG: 1.25 SOLUTION, CONCENTRATE RESPIRATORY (INHALATION) at 15:12

## 2017-03-08 RX ADMIN — METHYLPREDNISOLONE SODIUM SUCCINATE 40 MG: 40 INJECTION, POWDER, FOR SOLUTION INTRAMUSCULAR; INTRAVENOUS at 17:11

## 2017-03-08 RX ADMIN — VALSARTAN 80 MG: 80 TABLET ORAL at 21:46

## 2017-03-08 RX ADMIN — SERTRALINE 25 MG: 25 TABLET, FILM COATED ORAL at 08:33

## 2017-03-08 RX ADMIN — IPRATROPIUM BROMIDE 0.5 MG: 0.5 SOLUTION RESPIRATORY (INHALATION) at 20:18

## 2017-03-08 RX ADMIN — IPRATROPIUM BROMIDE 0.5 MG: 0.5 SOLUTION RESPIRATORY (INHALATION) at 15:12

## 2017-03-08 RX ADMIN — VALSARTAN 80 MG: 80 TABLET ORAL at 08:32

## 2017-03-09 ENCOUNTER — APPOINTMENT (INPATIENT)
Dept: PHYSICAL THERAPY | Facility: HOSPITAL | Age: 82
DRG: 203 | End: 2017-03-09
Payer: MEDICARE

## 2017-03-09 LAB
ANION GAP SERPL CALCULATED.3IONS-SCNC: 6 MMOL/L (ref 4–13)
BASOPHILS # BLD AUTO: 0 THOUSANDS/ΜL (ref 0–0.1)
BASOPHILS NFR BLD AUTO: 0 % (ref 0–1)
BUN SERPL-MCNC: 20 MG/DL (ref 5–25)
CALCIUM SERPL-MCNC: 8.2 MG/DL (ref 8.3–10.1)
CHLORIDE SERPL-SCNC: 104 MMOL/L (ref 100–108)
CO2 SERPL-SCNC: 32 MMOL/L (ref 21–32)
CREAT SERPL-MCNC: 0.65 MG/DL (ref 0.6–1.3)
DEPRECATED D DIMER PPP: 380 NG/ML (FEU) (ref 190–520)
EOSINOPHIL # BLD AUTO: 0 THOUSAND/ΜL (ref 0–0.61)
EOSINOPHIL NFR BLD AUTO: 0 % (ref 0–6)
ERYTHROCYTE [DISTWIDTH] IN BLOOD BY AUTOMATED COUNT: 14 % (ref 11.6–15.1)
GFR SERPL CREATININE-BSD FRML MDRD: >60 ML/MIN/1.73SQ M
GLUCOSE SERPL-MCNC: 139 MG/DL (ref 65–140)
HCT VFR BLD AUTO: 42.7 % (ref 37–47)
HGB BLD-MCNC: 14 G/DL (ref 12–16)
LYMPHOCYTES # BLD AUTO: 0.7 THOUSANDS/ΜL (ref 0.6–4.47)
LYMPHOCYTES NFR BLD AUTO: 7 % (ref 14–44)
MCH RBC QN AUTO: 29.9 PG (ref 27–31)
MCHC RBC AUTO-ENTMCNC: 32.7 G/DL (ref 31.4–37.4)
MCV RBC AUTO: 92 FL (ref 82–98)
MONOCYTES # BLD AUTO: 0.6 THOUSAND/ΜL (ref 0.17–1.22)
MONOCYTES NFR BLD AUTO: 6 % (ref 4–12)
NEUTROPHILS # BLD AUTO: 8.5 THOUSANDS/ΜL (ref 1.85–7.62)
NEUTS SEG NFR BLD AUTO: 87 % (ref 43–75)
NRBC BLD AUTO-RTO: 0 /100 WBCS
PLATELET # BLD AUTO: 213 THOUSANDS/UL (ref 130–400)
PMV BLD AUTO: 8.1 FL (ref 8.9–12.7)
POTASSIUM SERPL-SCNC: 3.8 MMOL/L (ref 3.5–5.3)
RBC # BLD AUTO: 4.67 MILLION/UL (ref 4.2–5.4)
SODIUM SERPL-SCNC: 142 MMOL/L (ref 136–145)
WBC # BLD AUTO: 9.8 THOUSAND/UL (ref 4.8–10.8)

## 2017-03-09 PROCEDURE — 94668 MNPJ CHEST WALL SBSQ: CPT

## 2017-03-09 PROCEDURE — 97110 THERAPEUTIC EXERCISES: CPT

## 2017-03-09 PROCEDURE — 85025 COMPLETE CBC W/AUTO DIFF WBC: CPT | Performed by: INTERNAL MEDICINE

## 2017-03-09 PROCEDURE — 94150 VITAL CAPACITY TEST: CPT

## 2017-03-09 PROCEDURE — 94640 AIRWAY INHALATION TREATMENT: CPT

## 2017-03-09 PROCEDURE — 94760 N-INVAS EAR/PLS OXIMETRY 1: CPT

## 2017-03-09 PROCEDURE — 80048 BASIC METABOLIC PNL TOTAL CA: CPT | Performed by: INTERNAL MEDICINE

## 2017-03-09 PROCEDURE — 85379 FIBRIN DEGRADATION QUANT: CPT | Performed by: INTERNAL MEDICINE

## 2017-03-09 RX ORDER — METHYLPREDNISOLONE SODIUM SUCCINATE 40 MG/ML
20 INJECTION, POWDER, LYOPHILIZED, FOR SOLUTION INTRAMUSCULAR; INTRAVENOUS EVERY 8 HOURS
Status: DISCONTINUED | OUTPATIENT
Start: 2017-03-09 | End: 2017-03-09

## 2017-03-09 RX ORDER — VALSARTAN 80 MG/1
TABLET ORAL
Status: COMPLETED
Start: 2017-03-09 | End: 2017-03-09

## 2017-03-09 RX ORDER — IPRATROPIUM BROMIDE AND ALBUTEROL SULFATE 2.5; .5 MG/3ML; MG/3ML
SOLUTION RESPIRATORY (INHALATION)
Status: DISPENSED
Start: 2017-03-09 | End: 2017-03-10

## 2017-03-09 RX ORDER — HEPARIN SODIUM 5000 [USP'U]/ML
INJECTION, SOLUTION INTRAVENOUS; SUBCUTANEOUS
Status: COMPLETED
Start: 2017-03-09 | End: 2017-03-09

## 2017-03-09 RX ORDER — METHYLPREDNISOLONE SODIUM SUCCINATE 40 MG/ML
20 INJECTION, POWDER, LYOPHILIZED, FOR SOLUTION INTRAMUSCULAR; INTRAVENOUS EVERY 8 HOURS
Status: DISCONTINUED | OUTPATIENT
Start: 2017-03-09 | End: 2017-03-12 | Stop reason: HOSPADM

## 2017-03-09 RX ORDER — MONTELUKAST SODIUM 10 MG/1
TABLET ORAL
Status: COMPLETED
Start: 2017-03-09 | End: 2017-03-09

## 2017-03-09 RX ORDER — LEVALBUTEROL 1.25 MG/.5ML
SOLUTION, CONCENTRATE RESPIRATORY (INHALATION)
Status: DISPENSED
Start: 2017-03-09 | End: 2017-03-10

## 2017-03-09 RX ADMIN — IPRATROPIUM BROMIDE 0.5 MG: 0.5 SOLUTION RESPIRATORY (INHALATION) at 15:04

## 2017-03-09 RX ADMIN — METHYLPREDNISOLONE SODIUM SUCCINATE 40 MG: 40 INJECTION, POWDER, FOR SOLUTION INTRAMUSCULAR; INTRAVENOUS at 02:09

## 2017-03-09 RX ADMIN — FLUTICASONE PROPIONATE 1 SPRAY: 50 SPRAY, METERED NASAL at 08:29

## 2017-03-09 RX ADMIN — VALSARTAN 80 MG: 80 TABLET ORAL at 08:29

## 2017-03-09 RX ADMIN — IPRATROPIUM BROMIDE 0.5 MG: 0.5 SOLUTION RESPIRATORY (INHALATION) at 07:20

## 2017-03-09 RX ADMIN — LEVALBUTEROL 1.25 MG: 1.25 SOLUTION, CONCENTRATE RESPIRATORY (INHALATION) at 05:24

## 2017-03-09 RX ADMIN — MENTHOL 1 LOZENGE: 5.4 LOZENGE ORAL at 22:05

## 2017-03-09 RX ADMIN — HEPARIN SODIUM 5000 UNITS: 5000 INJECTION, SOLUTION INTRAVENOUS; SUBCUTANEOUS at 05:45

## 2017-03-09 RX ADMIN — Medication 400 MCG: at 08:29

## 2017-03-09 RX ADMIN — IPRATROPIUM BROMIDE 0.5 MG: 0.5 SOLUTION RESPIRATORY (INHALATION) at 05:24

## 2017-03-09 RX ADMIN — AZITHROMYCIN 500 MG: 250 TABLET, FILM COATED ORAL at 15:44

## 2017-03-09 RX ADMIN — HEPARIN SODIUM 5000 UNITS: 5000 INJECTION, SOLUTION INTRAVENOUS; SUBCUTANEOUS at 21:30

## 2017-03-09 RX ADMIN — AMLODIPINE BESYLATE 5 MG: 5 TABLET ORAL at 08:29

## 2017-03-09 RX ADMIN — IPRATROPIUM BROMIDE 0.5 MG: 0.5 SOLUTION RESPIRATORY (INHALATION) at 11:10

## 2017-03-09 RX ADMIN — LEVALBUTEROL HYDROCHLORIDE 1.25 MG: 1.25 SOLUTION, CONCENTRATE RESPIRATORY (INHALATION) at 11:10

## 2017-03-09 RX ADMIN — MENTHOL 5.4 MG: 5.4 LOZENGE ORAL at 11:48

## 2017-03-09 RX ADMIN — METHYLPREDNISOLONE SODIUM SUCCINATE 20 MG: 40 INJECTION, POWDER, FOR SOLUTION INTRAMUSCULAR; INTRAVENOUS at 18:16

## 2017-03-09 RX ADMIN — LEVALBUTEROL HYDROCHLORIDE 1.25 MG: 1.25 SOLUTION, CONCENTRATE RESPIRATORY (INHALATION) at 07:20

## 2017-03-09 RX ADMIN — VALSARTAN 80 MG: 80 TABLET ORAL at 21:35

## 2017-03-09 RX ADMIN — MONTELUKAST SODIUM 10 MG: 10 TABLET ORAL at 21:30

## 2017-03-09 RX ADMIN — METHYLPREDNISOLONE SODIUM SUCCINATE 20 MG: 40 INJECTION, POWDER, FOR SOLUTION INTRAMUSCULAR; INTRAVENOUS at 10:38

## 2017-03-09 RX ADMIN — MONTELUKAST SODIUM 10 MG: 10 TABLET, COATED ORAL at 21:30

## 2017-03-09 RX ADMIN — HEPARIN SODIUM 5000 UNITS: 5000 INJECTION, SOLUTION INTRAVENOUS; SUBCUTANEOUS at 14:21

## 2017-03-09 RX ADMIN — LEVALBUTEROL HYDROCHLORIDE 1.25 MG: 1.25 SOLUTION, CONCENTRATE RESPIRATORY (INHALATION) at 15:04

## 2017-03-09 RX ADMIN — SERTRALINE 25 MG: 25 TABLET, FILM COATED ORAL at 08:29

## 2017-03-10 ENCOUNTER — APPOINTMENT (INPATIENT)
Dept: OCCUPATIONAL THERAPY | Facility: HOSPITAL | Age: 82
DRG: 203 | End: 2017-03-10
Payer: MEDICARE

## 2017-03-10 ENCOUNTER — GENERIC CONVERSION - ENCOUNTER (OUTPATIENT)
Dept: OTHER | Facility: OTHER | Age: 82
End: 2017-03-10

## 2017-03-10 ENCOUNTER — APPOINTMENT (INPATIENT)
Dept: PHYSICAL THERAPY | Facility: HOSPITAL | Age: 82
DRG: 203 | End: 2017-03-10
Payer: MEDICARE

## 2017-03-10 LAB
BACTERIA BLD CULT: NORMAL
BACTERIA BLD CULT: NORMAL

## 2017-03-10 PROCEDURE — 97110 THERAPEUTIC EXERCISES: CPT

## 2017-03-10 PROCEDURE — 94640 AIRWAY INHALATION TREATMENT: CPT

## 2017-03-10 PROCEDURE — 94760 N-INVAS EAR/PLS OXIMETRY 1: CPT

## 2017-03-10 RX ORDER — HEPARIN SODIUM 5000 [USP'U]/ML
INJECTION, SOLUTION INTRAVENOUS; SUBCUTANEOUS
Status: COMPLETED
Start: 2017-03-10 | End: 2017-03-10

## 2017-03-10 RX ORDER — METHYLPREDNISOLONE SODIUM SUCCINATE 40 MG/ML
INJECTION, POWDER, LYOPHILIZED, FOR SOLUTION INTRAMUSCULAR; INTRAVENOUS
Status: COMPLETED
Start: 2017-03-10 | End: 2017-03-10

## 2017-03-10 RX ORDER — LEVALBUTEROL 1.25 MG/.5ML
SOLUTION, CONCENTRATE RESPIRATORY (INHALATION)
Status: DISPENSED
Start: 2017-03-10 | End: 2017-03-10

## 2017-03-10 RX ADMIN — FLUTICASONE PROPIONATE 1 SPRAY: 50 SPRAY, METERED NASAL at 09:32

## 2017-03-10 RX ADMIN — LEVALBUTEROL HYDROCHLORIDE 1.25 MG: 1.25 SOLUTION, CONCENTRATE RESPIRATORY (INHALATION) at 15:47

## 2017-03-10 RX ADMIN — METHYLPREDNISOLONE SODIUM SUCCINATE 20 MG: 40 INJECTION, POWDER, FOR SOLUTION INTRAMUSCULAR; INTRAVENOUS at 03:14

## 2017-03-10 RX ADMIN — VALSARTAN 80 MG: 80 TABLET ORAL at 21:59

## 2017-03-10 RX ADMIN — VALSARTAN 80 MG: 80 TABLET ORAL at 09:32

## 2017-03-10 RX ADMIN — LEVALBUTEROL HYDROCHLORIDE 1.25 MG: 1.25 SOLUTION, CONCENTRATE RESPIRATORY (INHALATION) at 20:13

## 2017-03-10 RX ADMIN — MONTELUKAST SODIUM 10 MG: 10 TABLET, COATED ORAL at 21:59

## 2017-03-10 RX ADMIN — IPRATROPIUM BROMIDE 0.5 MG: 0.5 SOLUTION RESPIRATORY (INHALATION) at 07:37

## 2017-03-10 RX ADMIN — IPRATROPIUM BROMIDE 0.5 MG: 0.5 SOLUTION RESPIRATORY (INHALATION) at 15:46

## 2017-03-10 RX ADMIN — IPRATROPIUM BROMIDE 0.5 MG: 0.5 SOLUTION RESPIRATORY (INHALATION) at 11:10

## 2017-03-10 RX ADMIN — METHYLPREDNISOLONE SODIUM SUCCINATE 20 MG: 40 INJECTION, POWDER, FOR SOLUTION INTRAMUSCULAR; INTRAVENOUS at 18:35

## 2017-03-10 RX ADMIN — MENTHOL 5.4 MG: 5.4 LOZENGE ORAL at 14:29

## 2017-03-10 RX ADMIN — HEPARIN SODIUM 5000 UNITS: 5000 INJECTION, SOLUTION INTRAVENOUS; SUBCUTANEOUS at 14:23

## 2017-03-10 RX ADMIN — Medication 400 MCG: at 09:32

## 2017-03-10 RX ADMIN — IPRATROPIUM BROMIDE 0.5 MG: 0.5 SOLUTION RESPIRATORY (INHALATION) at 20:12

## 2017-03-10 RX ADMIN — SERTRALINE 25 MG: 25 TABLET, FILM COATED ORAL at 09:31

## 2017-03-10 RX ADMIN — LEVALBUTEROL HYDROCHLORIDE 1.25 MG: 1.25 SOLUTION, CONCENTRATE RESPIRATORY (INHALATION) at 07:38

## 2017-03-10 RX ADMIN — LEVALBUTEROL HYDROCHLORIDE 1.25 MG: 1.25 SOLUTION, CONCENTRATE RESPIRATORY (INHALATION) at 11:10

## 2017-03-10 RX ADMIN — HEPARIN SODIUM 5000 UNITS: 5000 INJECTION, SOLUTION INTRAVENOUS; SUBCUTANEOUS at 06:28

## 2017-03-10 RX ADMIN — METHYLPREDNISOLONE SODIUM SUCCINATE 20 MG: 40 INJECTION, POWDER, FOR SOLUTION INTRAMUSCULAR; INTRAVENOUS at 11:37

## 2017-03-10 RX ADMIN — AMLODIPINE BESYLATE 5 MG: 5 TABLET ORAL at 09:33

## 2017-03-10 RX ADMIN — HEPARIN SODIUM 5000 UNITS: 5000 INJECTION, SOLUTION INTRAVENOUS; SUBCUTANEOUS at 21:59

## 2017-03-11 PROCEDURE — 94640 AIRWAY INHALATION TREATMENT: CPT

## 2017-03-11 PROCEDURE — 94761 N-INVAS EAR/PLS OXIMETRY MLT: CPT

## 2017-03-11 PROCEDURE — 94760 N-INVAS EAR/PLS OXIMETRY 1: CPT

## 2017-03-11 RX ADMIN — IPRATROPIUM BROMIDE 0.5 MG: 0.5 SOLUTION RESPIRATORY (INHALATION) at 16:14

## 2017-03-11 RX ADMIN — MONTELUKAST SODIUM 10 MG: 10 TABLET, COATED ORAL at 21:03

## 2017-03-11 RX ADMIN — METHYLPREDNISOLONE SODIUM SUCCINATE 20 MG: 40 INJECTION, POWDER, FOR SOLUTION INTRAMUSCULAR; INTRAVENOUS at 18:23

## 2017-03-11 RX ADMIN — METHYLPREDNISOLONE SODIUM SUCCINATE 20 MG: 40 INJECTION, POWDER, FOR SOLUTION INTRAMUSCULAR; INTRAVENOUS at 12:47

## 2017-03-11 RX ADMIN — LEVALBUTEROL HYDROCHLORIDE 1.25 MG: 1.25 SOLUTION, CONCENTRATE RESPIRATORY (INHALATION) at 07:28

## 2017-03-11 RX ADMIN — METHYLPREDNISOLONE SODIUM SUCCINATE 20 MG: 40 INJECTION, POWDER, FOR SOLUTION INTRAMUSCULAR; INTRAVENOUS at 03:31

## 2017-03-11 RX ADMIN — VALSARTAN 80 MG: 80 TABLET ORAL at 09:09

## 2017-03-11 RX ADMIN — IPRATROPIUM BROMIDE 0.5 MG: 0.5 SOLUTION RESPIRATORY (INHALATION) at 11:27

## 2017-03-11 RX ADMIN — VALSARTAN 80 MG: 80 TABLET ORAL at 21:03

## 2017-03-11 RX ADMIN — MENTHOL 5.4 MG: 5.4 LOZENGE ORAL at 22:36

## 2017-03-11 RX ADMIN — FLUTICASONE PROPIONATE 1 SPRAY: 50 SPRAY, METERED NASAL at 09:09

## 2017-03-11 RX ADMIN — HEPARIN SODIUM 5000 UNITS: 5000 INJECTION, SOLUTION INTRAVENOUS; SUBCUTANEOUS at 21:02

## 2017-03-11 RX ADMIN — HEPARIN SODIUM 5000 UNITS: 5000 INJECTION, SOLUTION INTRAVENOUS; SUBCUTANEOUS at 14:09

## 2017-03-11 RX ADMIN — IPRATROPIUM BROMIDE 0.5 MG: 0.5 SOLUTION RESPIRATORY (INHALATION) at 07:28

## 2017-03-11 RX ADMIN — LEVALBUTEROL HYDROCHLORIDE 1.25 MG: 1.25 SOLUTION, CONCENTRATE RESPIRATORY (INHALATION) at 19:59

## 2017-03-11 RX ADMIN — LEVALBUTEROL HYDROCHLORIDE 1.25 MG: 1.25 SOLUTION, CONCENTRATE RESPIRATORY (INHALATION) at 16:14

## 2017-03-11 RX ADMIN — SERTRALINE 25 MG: 25 TABLET, FILM COATED ORAL at 09:10

## 2017-03-11 RX ADMIN — IPRATROPIUM BROMIDE 0.5 MG: 0.5 SOLUTION RESPIRATORY (INHALATION) at 19:58

## 2017-03-11 RX ADMIN — AMLODIPINE BESYLATE 5 MG: 5 TABLET ORAL at 09:09

## 2017-03-11 RX ADMIN — HEPARIN SODIUM 5000 UNITS: 5000 INJECTION, SOLUTION INTRAVENOUS; SUBCUTANEOUS at 05:25

## 2017-03-11 RX ADMIN — Medication 400 MCG: at 09:09

## 2017-03-11 RX ADMIN — LEVALBUTEROL HYDROCHLORIDE 1.25 MG: 1.25 SOLUTION, CONCENTRATE RESPIRATORY (INHALATION) at 11:27

## 2017-03-12 ENCOUNTER — GENERIC CONVERSION - ENCOUNTER (OUTPATIENT)
Dept: OTHER | Facility: OTHER | Age: 82
End: 2017-03-12

## 2017-03-12 VITALS
DIASTOLIC BLOOD PRESSURE: 67 MMHG | SYSTOLIC BLOOD PRESSURE: 139 MMHG | HEART RATE: 91 BPM | BODY MASS INDEX: 24.75 KG/M2 | RESPIRATION RATE: 16 BRPM | HEIGHT: 64 IN | OXYGEN SATURATION: 92 % | WEIGHT: 145 LBS | TEMPERATURE: 97.6 F

## 2017-03-12 PROCEDURE — 94640 AIRWAY INHALATION TREATMENT: CPT

## 2017-03-12 PROCEDURE — 94668 MNPJ CHEST WALL SBSQ: CPT

## 2017-03-12 PROCEDURE — 94760 N-INVAS EAR/PLS OXIMETRY 1: CPT

## 2017-03-12 RX ORDER — PREDNISONE 20 MG/1
20 TABLET ORAL DAILY
Qty: 30 TABLET | Refills: 0 | Status: SHIPPED | OUTPATIENT
Start: 2017-03-12 | End: 2017-04-11

## 2017-03-12 RX ADMIN — FLUTICASONE PROPIONATE 1 SPRAY: 50 SPRAY, METERED NASAL at 09:08

## 2017-03-12 RX ADMIN — METHYLPREDNISOLONE SODIUM SUCCINATE 20 MG: 40 INJECTION, POWDER, FOR SOLUTION INTRAMUSCULAR; INTRAVENOUS at 13:03

## 2017-03-12 RX ADMIN — Medication 400 MCG: at 09:08

## 2017-03-12 RX ADMIN — METHYLPREDNISOLONE SODIUM SUCCINATE 20 MG: 40 INJECTION, POWDER, FOR SOLUTION INTRAMUSCULAR; INTRAVENOUS at 03:03

## 2017-03-12 RX ADMIN — VALSARTAN 80 MG: 80 TABLET ORAL at 09:08

## 2017-03-12 RX ADMIN — IPRATROPIUM BROMIDE 0.5 MG: 0.5 SOLUTION RESPIRATORY (INHALATION) at 07:45

## 2017-03-12 RX ADMIN — IPRATROPIUM BROMIDE 0.5 MG: 0.5 SOLUTION RESPIRATORY (INHALATION) at 11:55

## 2017-03-12 RX ADMIN — HEPARIN SODIUM 5000 UNITS: 5000 INJECTION, SOLUTION INTRAVENOUS; SUBCUTANEOUS at 05:05

## 2017-03-12 RX ADMIN — SERTRALINE 25 MG: 25 TABLET, FILM COATED ORAL at 09:08

## 2017-03-12 RX ADMIN — LEVALBUTEROL HYDROCHLORIDE 1.25 MG: 1.25 SOLUTION, CONCENTRATE RESPIRATORY (INHALATION) at 11:55

## 2017-03-12 RX ADMIN — LEVALBUTEROL HYDROCHLORIDE 1.25 MG: 1.25 SOLUTION, CONCENTRATE RESPIRATORY (INHALATION) at 07:45

## 2017-03-12 RX ADMIN — AMLODIPINE BESYLATE 5 MG: 5 TABLET ORAL at 09:08

## 2017-04-03 ENCOUNTER — ALLSCRIPTS OFFICE VISIT (OUTPATIENT)
Dept: OTHER | Facility: OTHER | Age: 82
End: 2017-04-03

## 2017-07-12 ENCOUNTER — ALLSCRIPTS OFFICE VISIT (OUTPATIENT)
Dept: OTHER | Facility: OTHER | Age: 82
End: 2017-07-12

## 2017-08-17 ENCOUNTER — TRANSCRIBE ORDERS (OUTPATIENT)
Dept: ADMINISTRATIVE | Facility: HOSPITAL | Age: 82
End: 2017-08-17

## 2017-08-17 ENCOUNTER — HOSPITAL ENCOUNTER (OUTPATIENT)
Dept: RADIOLOGY | Facility: HOSPITAL | Age: 82
Discharge: HOME | End: 2017-08-17
Attending: INTERNAL MEDICINE
Payer: MEDICARE

## 2017-08-17 DIAGNOSIS — M25.551 RIGHT HIP PAIN: Primary | ICD-10-CM

## 2017-08-17 DIAGNOSIS — M25.551 PAIN IN RIGHT HIP: ICD-10-CM

## 2017-08-17 PROCEDURE — 73502 X-RAY EXAM HIP UNI 2-3 VIEWS: CPT

## 2017-08-20 ENCOUNTER — GENERIC CONVERSION - ENCOUNTER (OUTPATIENT)
Dept: OTHER | Facility: OTHER | Age: 82
End: 2017-08-20

## 2017-08-28 ENCOUNTER — GENERIC CONVERSION - ENCOUNTER (OUTPATIENT)
Dept: OTHER | Facility: OTHER | Age: 82
End: 2017-08-28

## 2017-11-12 DIAGNOSIS — E78.5 HYPERLIPIDEMIA: ICD-10-CM

## 2017-11-17 ENCOUNTER — TRANSCRIBE ORDERS (OUTPATIENT)
Dept: ADMINISTRATIVE | Facility: HOSPITAL | Age: 82
End: 2017-11-17

## 2017-11-17 ENCOUNTER — APPOINTMENT (OUTPATIENT)
Dept: LAB | Facility: HOSPITAL | Age: 82
End: 2017-11-17
Attending: INTERNAL MEDICINE
Payer: MEDICARE

## 2017-11-17 DIAGNOSIS — E78.5 HYPERLIPIDEMIA: ICD-10-CM

## 2017-11-17 LAB
ALBUMIN SERPL BCP-MCNC: 3.7 G/DL (ref 3.5–5)
ALP SERPL-CCNC: 72 U/L (ref 46–116)
ALT SERPL W P-5'-P-CCNC: 26 U/L (ref 12–78)
ANION GAP SERPL CALCULATED.3IONS-SCNC: 9 MMOL/L (ref 4–13)
AST SERPL W P-5'-P-CCNC: 20 U/L (ref 5–45)
BASOPHILS # BLD AUTO: 0.1 THOUSANDS/ΜL (ref 0–0.1)
BASOPHILS NFR BLD AUTO: 1 % (ref 0–1)
BILIRUB SERPL-MCNC: 0.5 MG/DL (ref 0.2–1)
BUN SERPL-MCNC: 14 MG/DL (ref 5–25)
CALCIUM SERPL-MCNC: 9.4 MG/DL (ref 8.3–10.1)
CHLORIDE SERPL-SCNC: 101 MMOL/L (ref 100–108)
CHOLEST SERPL-MCNC: 254 MG/DL (ref 50–200)
CO2 SERPL-SCNC: 28 MMOL/L (ref 21–32)
CREAT SERPL-MCNC: 0.65 MG/DL (ref 0.6–1.3)
EOSINOPHIL # BLD AUTO: 0.2 THOUSAND/ΜL (ref 0–0.61)
EOSINOPHIL NFR BLD AUTO: 3 % (ref 0–6)
ERYTHROCYTE [DISTWIDTH] IN BLOOD BY AUTOMATED COUNT: 13.1 % (ref 11.6–15.1)
GFR SERPL CREATININE-BSD FRML MDRD: 77 ML/MIN/1.73SQ M
GLUCOSE P FAST SERPL-MCNC: 124 MG/DL (ref 65–99)
HCT VFR BLD AUTO: 46 % (ref 37–47)
HDLC SERPL-MCNC: 64 MG/DL (ref 40–60)
HGB BLD-MCNC: 15.1 G/DL (ref 12–16)
LDLC SERPL CALC-MCNC: 174 MG/DL (ref 0–100)
LYMPHOCYTES # BLD AUTO: 1.3 THOUSANDS/ΜL (ref 0.6–4.47)
LYMPHOCYTES NFR BLD AUTO: 18 % (ref 14–44)
MCH RBC QN AUTO: 29.6 PG (ref 27–31)
MCHC RBC AUTO-ENTMCNC: 32.9 G/DL (ref 31.4–37.4)
MCV RBC AUTO: 90 FL (ref 82–98)
MONOCYTES # BLD AUTO: 0.6 THOUSAND/ΜL (ref 0.17–1.22)
MONOCYTES NFR BLD AUTO: 7 % (ref 4–12)
NEUTROPHILS # BLD AUTO: 5.3 THOUSANDS/ΜL (ref 1.85–7.62)
NEUTS SEG NFR BLD AUTO: 71 % (ref 43–75)
PLATELET # BLD AUTO: 207 THOUSANDS/UL (ref 130–400)
PMV BLD AUTO: 8.1 FL (ref 8.9–12.7)
POTASSIUM SERPL-SCNC: 3.7 MMOL/L (ref 3.5–5.3)
PROT SERPL-MCNC: 7.7 G/DL (ref 6.4–8.2)
RBC # BLD AUTO: 5.1 MILLION/UL (ref 4.2–5.4)
SODIUM SERPL-SCNC: 138 MMOL/L (ref 136–145)
TRIGL SERPL-MCNC: 82 MG/DL
WBC # BLD AUTO: 7.5 THOUSAND/UL (ref 4.8–10.8)

## 2017-11-17 PROCEDURE — 36415 COLL VENOUS BLD VENIPUNCTURE: CPT

## 2017-11-17 PROCEDURE — 85025 COMPLETE CBC W/AUTO DIFF WBC: CPT

## 2017-11-17 PROCEDURE — 80053 COMPREHEN METABOLIC PANEL: CPT

## 2017-11-17 PROCEDURE — 80061 LIPID PANEL: CPT

## 2017-11-19 ENCOUNTER — GENERIC CONVERSION - ENCOUNTER (OUTPATIENT)
Dept: OTHER | Facility: OTHER | Age: 82
End: 2017-11-19

## 2017-11-20 ENCOUNTER — ALLSCRIPTS OFFICE VISIT (OUTPATIENT)
Dept: OTHER | Facility: OTHER | Age: 82
End: 2017-11-20

## 2017-11-21 NOTE — PROGRESS NOTES
Assessment    1  Depression (311) (F32 9)   2  Hypertension (401 9) (I10)   3  Flu vaccine need (V04 81) (Z23)   4  Frequent falls (V15 88) (R29 6)   5  Abnormal gait (781 2) (R26 9)   6  Hyperlipidemia (272 4) (E78 5)    Plan  Abnormal gait, Frequent falls    · Tasha Boroughs With Seat; Status:Complete;   Done: 29PUH0658  Anxiety    · Sertraline HCl - 25 MG Oral Tablet; Take 1 tablet daily  Flu vaccine need    · Fluzone High-Dose 0 5 ML Intramuscular Suspension Prefilled Syringe; 0 5ml IM x 1; To Be Done: 60EYB7586  Hypertension    · Valsartan 160 MG Oral Tablet (Diovan); TAKE 1 TABLET TWICE DAILY  FORBLOOD PRESSURE   · Call [phone] if: You become dizzy or lightheaded, especially when you stand upafter sitting for a while ; Status:Complete;   Done: 82RVK8685   · Call [phone] if: You develop double vision (see two of everything)  ;Status:Complete;   Done: 10DYD4174   · Call [phone] if: Your blood pressure is frequently higher than 140/90  ;Status:Complete;   Done: 44HFL2960   · A diet low in sodium and high in potassium, magnesium, and calcium can help yourblood pressure ; Status:Complete;   Done: 98WKC9587   · There are many exercise options for seniors ; Status:Complete;   Done: 02BKD2983   · We recommend that you bring your body mass index down to 26 ; Status:Complete;  Done: 48VBI1464   · Follow-up visit in 4 Months Evaluation and Treatment  Follow-up  Status: Hold For -Scheduling  Requested for: 20Nov2017    Discussion/Summary    Hypertension stable, continue meds  labs with patient, cholesterol elevated but stable  Refuses statin  well controlled  up in 4 months  Chief Complaint  pt present for 4 month follow up on hypertension  af/rma      History of Present Illness  She was trying to back up into her recliner and fell in August  Dr Vernard Simmonds saw this and referred her to Dr Gavin Canas  She is better and has been using her walker   She is doing her own exercises, does not want PT well in general  No recent respiratory infections  The patient states her depression has improved since the last visit  She has no comorbid illnesses  She has had no significant interval events  Interval Symptoms: The patient is currently asymptomatic  Medications: the patient is adherent with her medication regimen  -- She denies medication side effects  The patient presents for follow-up of essential hypertension  The patient states she has been doing well with her blood pressure control since the last visit  She has no comorbid illnesses  She has no significant interval events  Symptoms: The patient is currently asymptomatic  Associated symptoms include no headache,-- no focal neurologic deficits-- and-- no memory loss  Medications: the patient is adherent with her medication regimen  -- She denies medication side effects  Review of Systems   Constitutional: No fever, no chills, feels well, no tiredness, no recent weight gain or weight loss  Cardiovascular: No complaints of slow heart rate, no fast heart rate, no chest pain, no palpitations, no leg claudication, no lower extremity edema  Respiratory: No complaints of shortness of breath, no wheezing, no cough, no SOB on exertion, no orthopnea, no PND  Active Problems  1  Abnormal blood chemistry (790 6) (R79 9)   2  Acute sinusitis (461 9) (J01 90)   3  Adult BMI 25 0-25 9 kg/sq m (V85 21) (Z68 25)   4  Anxiety (300 00) (F41 9)   5  Asthma (493 90) (J45 909)   6  Chronic rhinitis (472 0) (J31 0)   7  Depression (311) (F32 9)   8  Eczema (692 9) (L30 9)   9  Flu vaccine need (V04 81) (Z23)   10  Herpes zoster with nervous system complication (804 78) (I79 79)   11  Hip pain, right (719 45) (M25 551)   12  Hyperlipidemia (272 4) (E78 5)   13  Hypertension (401 9) (I10)   14  Need for chickenpox vaccination (V05 4) (Z23)   15  Post-menopausal osteoporosis (733 01) (M81 0)   16  Screening for diabetes mellitus (DM) (V77 1) (Z13 1)   17   Tinea corporis (110 5) (B35 4)   18  Umbilical hernia (264 2) (K42 9)    Past Medical History  1  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)   2  History of hypokalemia (V12 29) (Z86 39)    The active problems and past medical history were reviewed and updated today  Surgical History  1  History of Cataract Surgery   2  History of Small Bowel Resection   3  History of Transcath Intravascular Stent Placement Percutaneous Renal    The surgical history was reviewed and updated today  Family History    The family history was reviewed and updated today  Social History     · Former smoker (Q49 60) (L01 200)  The social history was reviewed and updated today  The social history was reviewed and is unchanged  Current Meds   1  Centrum Silver Oral Tablet; 1 Every Day; Therapy: 14NWI3910 to  Requested for: 35CJJ1841 Recorded   2  Fluticasone Propionate 50 MCG/ACT Nasal Suspension; USE TWO SPRAY(S) IN EACH NOSTRIL ONCE DAILY; Therapy: 80JKN9342 to [phone]  Requested for: 02PWP2388; Last VK:76DAO1509 Ordered   3  Folic Acid 000 MCG Oral Tablet; Therapy: (Recorded:02Tml4302) to Recorded   4  Glucosamine Chondr 500 Complex Oral Capsule; 1 Every Day; Therapy: 34XCE2974 to  Requested for: 70PAA1846 Recorded   5  Montelukast Sodium 10 MG Oral Tablet; TAKE ONE TABLET BY MOUTH ONCE DAILY AS DIRECTED; Therapy: 74GLY9824 to (Kristin Lewis)  Requested for: 89Nnj0305; Last Rx:69Knl9186; Status: ACTIVE - Transmit to Pharmacy - Awaiting Verification Ordered   6  Nystatin 082595 UNIT/GM External Powder; APPLY 2-3 TIMES DAILY TO AFFECTED AREA(S); Therapy: 89JMC8392 to (Last Rx:48Epp4605)  Requested for: 90Upu3793 Ordered   7  Omega-3 CF 1000 MG Oral Capsule; 1 Every Day At Bedtime; Therapy: 74QRD8645 to  Requested for: 09UQS3692 Recorded   8  Sertraline HCl - 25 MG Oral Tablet; Take 1 tablet daily; Therapy: 12QHQ8330 to (Last Rx:85Fcd5488)  Requested for: 09Lbz0769 Ordered   9   Valsartan 160 MG Oral Tablet; TAKE 1 TABLET TWICE DAILY  FOR BLOOD PRESSURE; Therapy: 33JZR9585 to (Eliel Marisol)  Requested for: 17DUU8842; Last Rx:26Jns5131 Ordered   10  Vitamin D 2000 UNIT Oral Tablet; 1 every day; Therapy: 63UQO4422 to  Requested for: 10EXV5097 Recorded   11  Xopenex HFA 45 MCG/ACT Inhalation Aerosol; INHALE 1 PUFF EVERY 4 HOURS AS  NEEDED; Therapy: 50AUM4514 to Recorded    The medication list was reviewed and updated today  Allergies  1  Contrast Media Ready-Box MISC   2  Aspirin Buffered TABS    Vitals  Vital Signs    Recorded: 20Nov2017 02:08PM Recorded: 20Nov2017 01:43PM   Temperature  97 9 F   Heart Rate  96   Respiration  16   Systolic 351 mm Hg 092 mm Hg   Diastolic 82 mm Hg 88 mm Hg   BP Comments recheck    Height  5 ft 2 in   Weight  139 lb    BMI Calculated  25 42 kg/m2   BSA Calculated  1 64 m2       Physical Exam   Constitutional  General appearance: Abnormal   acutely ill  Ears, Nose, Mouth, and Throat  Oropharynx: Normal with no erythema, edema, exudate or lesions  Pulmonary  Respiratory effort: No increased work of breathing or signs of respiratory distress  Auscultation of lungs: Clear to auscultation  Cardiovascular  Auscultation of heart: Normal rate and rhythm, normal S1 and S2, without murmurs  Examination of extremities for edema and/or varicosities: Normal    Carotid pulses: Normal    Abdomen  Abdomen: Abnormal  -- (5cm umbilical hernia, reducible, non tender)  Liver and spleen: No hepatomegaly or splenomegaly  Lymphatic  Palpation of lymph nodes in neck: No lymphadenopathy  Results/Data  PHQ-9 Adult Depression Screening 20Nov2017 01:48PM User, s     Test Name Result Flag Reference   PHQ-9 Adult Depression Score 1       Over the last two weeks, how often have you been bothered by any of the following problems?  Little interest or pleasure in doing things: Not at all - 0 Feeling down, depressed, or hopeless: Not at all - 0 Trouble falling or staying asleep, or sleeping too much: Several days - 1 Feeling tired or having little energy: Not at all - 0 Poor appetite or over eating: Not at all - 0 Feeling bad about yourself - or that you are a failure or have let yourself or your family down: Not at all - 0 Trouble concentrating on things, such as reading the newspaper or watching television: Not at all - 0 Moving or speaking so slowly that other people could have noticed   Or the opposite -  being so fidgety or restless that you have been moving around a lot more than usual: Not at all - 0 Thoughts that you would be better off dead, or of hurting yourself in some way: Not at all - 0   PHQ-9 Adult Depression Screening Negative     PHQ-9 Difficulty Level Not difficult at all     PHQ-9 Severity Minimal Depression           Signatures   Electronically signed by : MIGUEL ANGEL Holden ; Nov 20 2017  2:19PM EST                       (Author)

## 2018-01-10 NOTE — RESULT NOTES
Verified Results  (1) CBC/PLT/DIFF 83VHZ5706 09:44AM Carmine Bush Order Number: [id number]     Test Name Result Flag Reference   WBC COUNT 7 50 Thousand/uL  4 80-10 80   RBC COUNT 5 10 Million/uL  4 20-5 40   HEMOGLOBIN 15 1 g/dL  12 0-16 0   HEMATOCRIT 46 0 %  37 0-47 0   MCV 90 fL  82-98   MCH 29 6 pg  27 0-31 0   MCHC 32 9 g/dL  31 4-37 4   RDW 13 1 %  11 6-15 1   MPV 8 1 fL L 8 9-12 7   PLATELET COUNT 571 Thousands/uL  130-400   NEUTROPHILS RELATIVE PERCENT 71 %  43-75   LYMPHOCYTES RELATIVE PERCENT 18 %  14-44   MONOCYTES RELATIVE PERCENT 7 %  4-12   EOSINOPHILS RELATIVE PERCENT 3 %  0-6   BASOPHILS RELATIVE PERCENT 1 %  0-1   NEUTROPHILS ABSOLUTE COUNT 5 30 Thousands/? ??L  1 85-7 62   LYMPHOCYTES ABSOLUTE COUNT 1 30 Thousands/? ??L  0 60-4 47   MONOCYTES ABSOLUTE COUNT 0 60 Thousand/? ??L  0 17-1 22   EOSINOPHILS ABSOLUTE COUNT 0 20 Thousand/? ??L  0 00-0 61   BASOPHILS ABSOLUTE COUNT 0 10 Thousands/? ??L  0 00-0 10     (1) COMPREHENSIVE METABOLIC PANEL 47QKJ5460 74:32RF Carmine Bush Order Number: [id number]     Test Name Result Flag Reference   SODIUM 138 mmol/L  136-145   POTASSIUM 3 7 mmol/L  3 5-5 3   CHLORIDE 101 mmol/L  100-108   CARBON DIOXIDE 28 mmol/L  21-32   ANION GAP (CALC) 9 mmol/L  4-13   BLOOD UREA NITROGEN 14 mg/dL  5-25   CREATININE 0 65 mg/dL  0 60-1 30   Standardized to IDMS reference method   CALCIUM 9 4 mg/dL  8 3-10 1   BILI, TOTAL 0 50 mg/dL  0 20-1 00   ALK PHOSPHATAS 72 U/L     ALT (SGPT) 26 U/L  12-78   Specimen collection should occur prior to Sulfasalazine administration due to the potential for falsely depressed results  AST(SGOT) 20 U/L  5-45   Specimen collection should occur prior to Sulfasalazine administration due to the potential for falsely depressed results     ALBUMIN 3 7 g/dL  3 5-5 0   TOTAL PROTEIN 7 7 g/dL  6 4-8 2   eGFR 77 ml/min/1 73sq m     National Kidney Disease Education Program recommendations are as follows:  GFR calculation is accurate only with a steady state creatinine  Chronic Kidney disease less than 60 ml/min/1 73 sq  meters  Kidney failure less than 15 ml/min/1 73 sq  meters  GLUCOSE FASTING 124 mg/dL H 65-99   Specimen collection should occur prior to Sulfasalazine administration due to the potential for falsely depressed results  Specimen collection should occur prior to Sulfapyridine administration due to the potential for falsely elevated results  (1) LIPID PANEL, FASTING 23PIS7506 09:44AM Bennie Adkinsaby Order Number: [id number]     Test Name Result Flag Reference   CHOLESTEROL 254 mg/dL H    HDL,DIRECT 64 mg/dL H 40-60   Specimen collection should occur prior to Metamizole administration due to the potential for falsley depressed results  LDL CHOLESTEROL CALCULATED 174 mg/dL H 0-100   Triglyceride:        Normal <150 mg/dl   Borderline High 150-199 mg/dl   High 200-499 mg/dl   Very High >499 mg/dl      Cholesterol:       Desirable <200 mg/dl    Borderline High 200-239 mg/dl    High >239 mg/dl      HDL Cholesterol:       High>59 mg/dL    Low <41 mg/dL      This screening LDL is a calculated result  It does not have the accuracy of the Direct Measured LDL in the monitoring of patients with hyperlipidemia and/or statin therapy  Direct Measure LDL (GJU929) must be ordered separately in these patients  TRIGLYCERIDES 82 mg/dL  <=150   Specimen collection should occur prior to N-Acetylcysteine or Metamizole administration due to the potential for falsely depressed results

## 2018-01-10 NOTE — PROGRESS NOTES
History of Present Illness  Care Coordination Encounter Information:   Type of Encounter: Telephonic   Last Office Visit: 3/2/17   Spoke to Patient  Care Coordination SL Nurse [facility]:   The reason for call is to discuss outreach for follow up/needed services  I left a voice message to check on the status of Cosme Hernandez  I provided pertinent call back information  Active Problems    1  Abnormal blood chemistry (790 6) (R79 9)   2  Acute sinusitis (461 9) (J01 90)   3  Adult BMI 26 0-26 9 kg/sq m (V85 22) (Z68 26)   4  Anxiety (300 00) (F41 9)   5  Asthma (493 90) (J45 909)   6  Chronic rhinitis (472 0) (J31 0)   7  Depression (311) (F32 9)   8  Eczema (692 9) (L30 9)   9  Flu vaccine need (V04 81) (Z23)   10  Herpes zoster with nervous system complication (270 61) (E51 41)   11  Hyperlipidemia (272 4) (E78 5)   12  Hypertension (401 9) (I10)   13  Need for chickenpox vaccination (V05 4) (Z23)   14  Post-menopausal osteoporosis (733 01) (M81 0)   15  Screening for diabetes mellitus (DM) (V77 1) (Z13 1)   16  Umbilical hernia (850 4) (K42 9)    Past Medical History    1  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)   2  History of hypokalemia (V12 29) (Z86 39)    Surgical History    1  History of Cataract Surgery   2  History of Small Bowel Resection   3  History of Transcath Intravascular Stent Placement Percutaneous Renal    Social History    · Former smoker (K79 81) (A11 277)    Current Meds    1  Sertraline HCl - 25 MG Oral Tablet; Take 1 tablet daily; Therapy: 56YIO6492 to (Last Rx:02Mar2017)  Requested for: 56QGL0643 Ordered    2  Montelukast Sodium 10 MG Oral Tablet (Singulair); TAKE ONE TABLET BY MOUTH   ONCE DAILY AS DIRECTED; Therapy: 19MXV2616 to (Evaluate:27Jun2017)  Requested for: 94Zif2701; Last   Rx:17Ltc3776 Ordered   3  Xopenex HFA 45 MCG/ACT Inhalation Aerosol; INHALE 2 PUFFS EVERY 4 HOURS AS   NEEDED; Therapy: 25RPQ9980 to (Last Rx:02Mar2017)  Requested for: 77IST8392 Ordered    4  Flonase Allergy Relief 50 MCG/ACT Nasal Suspension (Fluticasone Propionate); instill 2   sprays into each nostril once daily; Therapy: 98RRW0772 to (Last Alberto Crimes)  Requested for: 29INR3588 Ordered    5  Centrum Silver Oral Tablet; 1 Every Day; Therapy: 73CRB6658 to  Requested for: 86PPK4588 Recorded   6  Folic Acid 094 MCG Oral Tablet; 1 DAILY Recorded   7  Glucosamine Chondr 500 Complex Oral Capsule; 1 Every Day; Therapy: 11QPL2465 to  Requested for: 22XCR8605 Recorded   8  [place]-3 CF 1000 MG Oral Capsule; 1 Every Day At Bedtime; Therapy: 85HUF3264 to  Requested for: 15JLI0581 Recorded    9  Valsartan 160 MG Oral Tablet (Diovan); TAKE 1 TABLET TWICE DAILY  FOR BLOOD   PRESSURE; Therapy: 97SES3511 to (Para Allis)  Requested for: 27YDA7677; Last   Rx:14Nov2016 Ordered    10  Amoxicillin 875 MG Oral Tablet; TAKE 1 TABLET EVERY 12 HOURS DAILY; Therapy: 01QXL8873 to (Complete:12Mar2017)  Requested for: 33KNQ3857; Last    Rx:02Mar2017 Ordered    11  Vitamin D 2000 UNIT Oral Tablet; 1 every day; Therapy: 88URH6364 to  Requested for: 83TYT9515 Recorded    Allergies    1  Contrast Media Ready-Box MISC   2  Aspirin Buffered TABS    End of Encounter Meds    1  Sertraline HCl - 25 MG Oral Tablet; Take 1 tablet daily; Therapy: 27VMA8231 to (Last Rx:02Mar2017)  Requested for: 79VJP5809 Ordered    2  Montelukast Sodium 10 MG Oral Tablet (Singulair); TAKE ONE TABLET BY MOUTH   ONCE DAILY AS DIRECTED; Therapy: 33VPM1658 to (Evaluate:27Jun2017)  Requested for: 64Fna0182; Last   Rx:59Pjd5059 Ordered   3  Xopenex HFA 45 MCG/ACT Inhalation Aerosol; INHALE 2 PUFFS EVERY 4 HOURS AS   NEEDED; Therapy: 74BAP1684 to (Last Rx:02Mar2017)  Requested for: 09OBZ2581 Ordered    4  Flonase Allergy Relief 50 MCG/ACT Nasal Suspension (Fluticasone Propionate); instill 2   sprays into each nostril once daily; Therapy: 63FEC5923 to (Last Alberto Crimes)  Requested for: 85PGI2305 Ordered    5   Centrum Silver Oral Tablet; 1 Every Day; Therapy: 23FGA5987 to  Requested for: 02VUL3237 Recorded   6  Folic Acid 841 MCG Oral Tablet; 1 DAILY Recorded   7  Glucosamine Chondr 500 Complex Oral Capsule; 1 Every Day; Therapy: 32OSP0708 to  Requested for: 45BFX8624 Recorded   8  Omega-3 CF 1000 MG Oral Capsule; 1 Every Day At Bedtime; Therapy: 66MXH2623 to  Requested for: 57NXT0319 Recorded    9  Valsartan 160 MG Oral Tablet (Diovan); TAKE 1 TABLET TWICE DAILY  FOR BLOOD   PRESSURE; Therapy: 88KJB4334 to (Johnson Askew)  Requested for: 93PMU4241; Last   Rx:14Nov2016 Ordered    10  Amoxicillin 875 MG Oral Tablet; TAKE 1 TABLET EVERY 12 HOURS DAILY; Therapy: 82GCS7920 to (Complete:12Mar2017)  Requested for: 14RLA8636; Last    Rx:02Mar2017 Ordered    11  Vitamin D 2000 UNIT Oral Tablet; 1 every day; Therapy: 28YTH3266 to  Requested for: 23NIX6929 Recorded    Future Appointments    Date/Time Provider Specialty Site   03/16/2017 12:30 PM MIGUEL ANGEL Fuentes  [address] [place] [address]     Patient Care Team    Care Team Member Role Specialty Office Number   Israel Zhang MD  Cardiology [phone]   [address] [phone]   carlene bal        Signatures   Electronically signed by :  Merlene Yang RN; Mar 10 2017  1:44PM EST                       (Author)

## 2018-01-11 NOTE — RESULT NOTES
Verified Results  * XR CHEST PA & LATERAL 94VBU2463 03:54PM Rama Orf     Test Name Result Flag Reference   XR CHEST PA & LATERAL (Report)     CHEST     INDICATION: Acute upper respiratory infection  Cough and congestion  COMPARISON: 9/6/2014     VIEWS: Frontal and lateral projections; 2 images     FINDINGS:       The cardiac silhouette is unremarkable  Mediastinal and hilar contour within normal limits  No acute consolidation or mass density evident  Lungs are remarkable for hyperinflation, flattening of the diaphragms, biapical pleural-parenchymal scarring and mild reticular interstitial thickening  Correlate with clinical findings for underlying    COPD/scarring  No significant interval change compared to priors  No pleural effusions  No pneumothorax  Bony thorax unremarkable  Biapical pleural-parenchymal scarring, interstitial thickening and findings suggesting COPD, stable compared to priors

## 2018-01-12 NOTE — RESULT NOTES
Verified Results  (1) CBC/PLT/DIFF 71KKG4216 09:29AM Vinod Ruano     Test Name Result Flag Reference   WBC COUNT 6 50 Thousand/uL  4 80-10 80   WBC ADJUSTED 6 50 Thousand/uL  4 80-10 80   RBC COUNT 5 20 Million/uL  4 20-5 40   HEMOGLOBIN 15 6 g/dL  12 0-16 0   HEMATOCRIT 47 9 % H 37 0-47 0   MCV 92 fL  82-98   MCH 30 0 pg  27 0-31 0   MCHC 32 5 g/dL  31 4-37 4   RDW 14 2 %  11 6-15 1   MPV 8 1 fL L 8 9-12 7   PLATELET COUNT 290 Thousands/uL  130-400   nRBC AUTOMATED 0 /100 WBCs     NEUTROPHILS RELATIVE PERCENT 65 %  43-75   LYMPHOCYTES RELATIVE PERCENT 23 %  14-44   MONOCYTES RELATIVE PERCENT 8 %  4-12   EOSINOPHILS RELATIVE PERCENT 5 %  0-6   BASOPHILS RELATIVE PERCENT 0 %  0-1   NEUTROPHILS ABSOLUTE COUNT 4 30 Thousands/µL  1 85-7 62   LYMPHOCYTES ABSOLUTE COUNT 1 50 Thousands/µL  0 60-4 47   MONOCYTES ABSOLUTE COUNT 0 50 Thousand/µL  0 17-1 22   EOSINOPHILS ABSOLUTE COUNT 0 30 Thousand/µL  0 00-0 61   BASOPHILS ABSOLUTE COUNT 0 00 Thousands/µL  0 00-0 10     (1) COMPREHENSIVE METABOLIC PANEL 26CVC0639 07:35GE Janett Leavitt [facility] Kidney Disease Education Program recommendations are as follows:  GFR calculation is accurate only with a steady state creatinine  Chronic Kidney disease less than 60 ml/min/1 73 sq  meters  Kidney failure less than 15 ml/min/1 73 sq  meters  Test Name Result Flag Reference   GLUCOSE,RANDM 105 mg/dL     If the patient is fasting, the ADA then defines impaired fasting glucose as > 100 mg/dL and diabetes as > or equal to 123 mg/dL     SODIUM 140 mmol/L  136-145   POTASSIUM 3 9 mmol/L  3 5-5 3   CHLORIDE 103 mmol/L  100-108   CARBON DIOXIDE 29 mmol/L  21-32   ANION GAP (CALC) 8 mmol/L  4-13   BLOOD UREA NITROGEN 14 mg/dL  5-25   CREATININE 0 72 mg/dL  0 60-1 30   Standardized to IDMS reference method   CALCIUM 9 4 mg/dL  8 3-10 1   BILI, TOTAL 0 50 mg/dL  0 20-1 00   ALK PHOSPHATAS 80 U/L     ALT (SGPT) 31 U/L  12-78   AST(SGOT) 28 U/L  5-45   ALBUMIN 3 6 g/dL 3 5-5 0   TOTAL PROTEIN 7 5 g/dL  6 4-8 2   eGFR Non-African American      >60 0 ml/min/1 73sq m     (1) LIPID PANEL, FASTING 97MWU9823 09:29AM Brandon Leavitt   Triglyceride:         Normal              <150 mg/dl       Borderline High    150-199 mg/dl       High               200-499 mg/dl       Very High          >499 mg/dl  Cholesterol:         Desirable        <200 mg/dl      Borderline High  200-239 mg/dl      High             >239 mg/dl  HDL Cholesterol:        High    >59 mg/dL      Low     <41 mg/dL     Test Name Result Flag Reference   CHOLESTEROL 246 mg/dL H    HDL,DIRECT 67 mg/dL H 40-60   LDL CHOLESTEROL CALCULATED 161 mg/dL H 0-100   TRIGLYCERIDES 91 mg/dL  <=150

## 2018-01-13 VITALS
WEIGHT: 145 LBS | BODY MASS INDEX: 25.69 KG/M2 | RESPIRATION RATE: 20 BRPM | HEIGHT: 63 IN | HEART RATE: 82 BPM | DIASTOLIC BLOOD PRESSURE: 88 MMHG | SYSTOLIC BLOOD PRESSURE: 132 MMHG | TEMPERATURE: 97.9 F

## 2018-01-13 NOTE — MISCELLANEOUS
Message         Recorded as Task   Date: 03/12/2017 02:56 PM, Created By: System   Task Name: Agusto Thorne   Assigned To: Rell Cast   Regarding Patient: Azucena Reese, Status: In Progress   Comment:    System - 12 Mar 2017 2:56 PM     Patient discharged from hospital   Patient Name: Dave Neal  Patient YOB: 1925  Discharge Date: 3/12/2017  Facility: [facility] - 13 Mar 2017 9:09 AM     TASK REASSIGNED: Previously Assigned To Sagrario Vallejo - 13 Mar 2017 3:40 PM     TASK EDITED  Talked with pt who felt that she didn't need to see Dr Pilar Lenz soon  She felt that she only needed to see Dr Carolynn Crenshaw on 03/20/2017  Will attempt to call pt's daughter today to see if she could encourage pt to make an apt   Genice Seal - 13 Mar 2017 3:40 PM     TASK IN PROGRESS   Talked with Ariaan Rothman, Pt's Daughter Pt is mostly home bound at this time  Dr Sherly Strong will be seeing the pt on 03/17/2017  Pt is SOB on exertion Using a walker at home Pt will be receiving Meals on wheels at home  Dr Carolynn Crenshaw apt will be on 03/20/2017 Possibly will then have Oxygen at home  All mediations are correct  Pt and daughter both stated they would call Dr Pilar Lenz if anything else is needed rmklpn      Active Problems    1  Abnormal blood chemistry (790 6) (R79 9)   2  Acute sinusitis (461 9) (J01 90)   3  Adult BMI 26 0-26 9 kg/sq m (V85 22) (Z68 26)   4  Anxiety (300 00) (F41 9)   5  Asthma (493 90) (J45 909)   6  Chronic rhinitis (472 0) (J31 0)   7  Depression (311) (F32 9)   8  Eczema (692 9) (L30 9)   9  Flu vaccine need (V04 81) (Z23)   10  Herpes zoster with nervous system complication (631 33) (V16 01)   11  Hyperlipidemia (272 4) (E78 5)   12  Hypertension (401 9) (I10)   13  Need for chickenpox vaccination (V05 4) (Z23)   14  Post-menopausal osteoporosis (733 01) (M81 0)   15  Screening for diabetes mellitus (DM) (V77 1) (Z13 1)   16  Umbilical hernia (888 7) (K42 9)    Current Meds   1  Centrum Silver Oral Tablet; 1 Every Day; Therapy: 89GQX5556 to  Requested for: 56YYR9627 Recorded   2  Flonase Allergy Relief 50 MCG/ACT Nasal Suspension; instill 2 sprays into each nostril   once daily; Therapy: 92WQD9274 to (Last Edmundo )  Requested for: 98LVG6311 Ordered   3  Folic Acid 307 MCG Oral Tablet; 1 DAILY Recorded   4  Glucosamine Chondr 500 Complex Oral Capsule; 1 Every Day; Therapy: 14PYZ0630 to  Requested for: 34YGS7331 Recorded   5  Montelukast Sodium 10 MG Oral Tablet; TAKE ONE TABLET BY MOUTH ONCE DAILY   AS DIRECTED; Therapy: 87SAX1857 to (Evaluate:27Jun2017)  Requested for: 69Mjb9154; Last   Rx:58Vpo7558 Ordered   6  Merrill-3 CF 1000 MG Oral Capsule; 1 Every Day At Bedtime; Therapy: 90IXY6361 to  Requested for: 72RFL5938 Recorded   7  Sertraline HCl - 25 MG Oral Tablet; Take 1 tablet daily; Therapy: 20JCZ3242 to (Last Rx:02Mar2017)  Requested for: 57STR9235 Ordered   8  Valsartan 160 MG Oral Tablet; TAKE 1 TABLET TWICE DAILY  FOR BLOOD PRESSURE; Therapy: 37QJP7499 to (Ashish Ibarra)  Requested for: 17XHA6839; Last   Rx:14Nov2016 Ordered   9  Vitamin D 2000 UNIT Oral Tablet; 1 every day; Therapy: 43QLW7107 to  Requested for: 97HMK4272 Recorded   10  Xopenex HFA 45 MCG/ACT Inhalation Aerosol; INHALE 2 PUFFS EVERY 4 HOURS AS    NEEDED; Therapy: 76YJT6002 to (Last Rx:02Mar2017)  Requested for: 02Mar2017 Ordered    Allergies    1  Contrast Media Ready-Box MISC   2   Aspirin Buffered TABS    Signatures   Electronically signed by : MIGUEL ANGEL Sidhu ; Mar 15 2017 11:41AM EST                       (Author)

## 2018-01-14 VITALS
BODY MASS INDEX: 25.58 KG/M2 | HEART RATE: 96 BPM | HEIGHT: 62 IN | TEMPERATURE: 97.9 F | DIASTOLIC BLOOD PRESSURE: 82 MMHG | SYSTOLIC BLOOD PRESSURE: 130 MMHG | RESPIRATION RATE: 16 BRPM | WEIGHT: 139 LBS

## 2018-01-14 VITALS
RESPIRATION RATE: 16 BRPM | SYSTOLIC BLOOD PRESSURE: 124 MMHG | BODY MASS INDEX: 25.01 KG/M2 | TEMPERATURE: 98 F | HEART RATE: 90 BPM | OXYGEN SATURATION: 92 % | DIASTOLIC BLOOD PRESSURE: 76 MMHG | WEIGHT: 141.13 LBS | HEIGHT: 63 IN

## 2018-01-14 VITALS
DIASTOLIC BLOOD PRESSURE: 74 MMHG | BODY MASS INDEX: 25.76 KG/M2 | SYSTOLIC BLOOD PRESSURE: 126 MMHG | HEIGHT: 62 IN | TEMPERATURE: 98 F | WEIGHT: 140 LBS | RESPIRATION RATE: 16 BRPM | HEART RATE: 84 BPM

## 2018-01-14 NOTE — PROGRESS NOTES
Assessment    1  Chronic rhinitis (472 0) (J31 0)    Plan  Chronic rhinitis    · Flonase Allergy Relief 50 MCG/ACT Nasal Suspension; instill 2 sprays into each  nostril once daily    Discussion/Summary    She will restart her flonase and add some saline NS prn  Advised cool mist humidifier, cracking the window as able, breathing in steam from hot beverage to help moisturize her nose  Follow up if not improving  Chief Complaint  no voice at all in am & off & on through day      History of Present Illness  HPI: She feels she got rid of most of the mucus but still has something she can't clear in the back of her throat  o further coughing  No fever  Gargles eery morning and this helps  Has flonase from last year but has not been using  She lives in an apartment building where she can't control the heat and it has been very hot  Review of Systems    Constitutional: No fever, no chills, feels well, no tiredness, no recent weight gain or loss  ENT: as noted in HPI  Active Problems    1  Abnormal blood chemistry (790 6) (R79 9)   2  Acute sinusitis (461 9) (J01 90)   3  Acute URI (465 9) (J06 9)   4  Ankle sprain (845 00) (S93 409A)   5  Anxiety (300 00) (F41 9)   6  Asthma (493 90) (J45 909)   7  Depression (311) (F32 9)   8  Flu vaccine need (V04 81) (Z23)   9  Gallstone ileus (560 31) (K56 3)   10  Herpes zoster with nervous system complication (892 36) (Y19 25)   11  Hyperlipidemia (272 4) (E78 5)   12  Hypertension (401 9) (I10)   13  Hypokalemia (276 8) (E87 6)   14  Need for chickenpox vaccination (V05 4) (Z23)   15  Otalgia, unspecified laterality (388 70) (H92 09)   16  Post-menopausal osteoporosis (733 01) (M81 0)   17  Screening for diabetes mellitus (DM) (V77 1) (Z13 1)    Past Medical History  Active Problems And Past Medical History Reviewed: The active problems and past medical history were reviewed and updated today  Family History  Family History Reviewed:    The family history was reviewed and updated today  Social History    · Former smoker (M53 44) (N27 574)  The social history was reviewed and updated today  The social history was reviewed and is unchanged  Surgical History    1  History of Cataract Surgery   2  History of Small Bowel Resection   3  History of Transcath Intravascular Stent Placement Percutaneous Renal  Surgical History Reviewed: The surgical history was reviewed and updated today  Current Meds   1  Centrum Silver Oral Tablet; 1 Every Day; Therapy: 83WGM8845 to  Requested for: 95IYR4489 Recorded   2  Diovan 80 MG Oral Tablet; TAKE 1 TABLET TWICE DAILY; Therapy: 98GKN7622 to (Evaluate:20Jun2015)  Requested for: 36NBS5040; Last   Rx:25Jun2014 Ordered   3  Folic Acid 721 MCG Oral Tablet Recorded   4  Gabapentin 100 MG Oral Capsule; TAKE 1 CAPSULE AT BEDTIME; Therapy: 78NCG0832 to (Evaluate:66Bog9566)  Requested for: 36CGZ6457; Last   Rx:26Jun2015 Ordered   5  Glucosamine Chondr 500 Complex Oral Capsule; 1 Every Day; Therapy: 40OGG5964 to  Requested for: 85ZDB3538 Recorded   6  Montelukast Sodium 10 MG Oral Tablet; TAKE 1 TABLET DAILY AS DIRECTED; Therapy: 27UGM0780 to (Evaluate:52Ftd2338)  Requested for: 44WGE8650; Last   Rx:23Nov2015 Ordered   7  Omega-3 CF 1000 MG Oral Capsule; 1 Every Day At Bedtime; Therapy: 14NEC2118 to  Requested for: 07MNM0803 Recorded   8  Vitamin D 2000 UNIT Oral Tablet; 1 Every Day; Therapy: 60GNZ7077 to  Requested for: 27WUN7649 Recorded   9  Xopenex HFA 45 MCG/ACT Inhalation Aerosol; AS  DIRECTED; Therapy: 22GYH4162 to  Requested for: 36NUQ0879 Recorded    The medication list was reviewed and updated today  Allergies    1  Contrast Media Ready-Box MISC   2   Aspirin Buffered TABS    Vitals   Recorded: 81JKK5490 03:24PM   Temperature 97 6 F   Heart Rate 84   Respiration 18   Systolic 519   Diastolic 82   Weight 288 lb      Physical Exam    Ears, Nose, Mouth, and Throat   External inspection of ears and nose: Normal     Otoscopic examination: Tympanic membranes translucent with normal light reflex  Canals patent without erythema  Nasal mucosa, septum, and turbinates: Abnormal   The bilateral nasal mucosa was red  Oropharynx: Abnormal   (some white post nasal drip posterior pharynx)   Pulmonary   Auscultation of lungs: Clear to auscultation  Cardiovascular   Auscultation of heart: Normal rate and rhythm, normal S1 and S2, without murmurs  Lymphatic   Palpation of lymph nodes in neck: No lymphadenopathy  Future Appointments    Date/Time Provider Specialty Site   03/28/2016 10:15 AM MIGUEL ANGEL Urbina   [address] [place] [address]     Signatures   Electronically signed by : MIGUEL ANGEL Friedman ; Jan 27 2016  3:50PM EST                       (Author)

## 2018-01-16 NOTE — RESULT NOTES
Discussion/Summary   Your xray looks normal  - Dr Barb Cantrell     Verified Results  * XR HIP/PELV 2-3 VWS RIGHT W PELVIS IF PERFORMED 17Aug2017 12:00AM Ofelia Leavitt     Test Name Result Flag Reference   * XR HIP/PELV 2-3 VWS RIGHT (Report)     RIGHT HIP     INDICATION: Patient fell on 8/7/2017 and has right hip pain  COMPARISON: Abdomen and pelvic CT from 9/6/2014  VIEWS: AP pelvis and 2 coned down views of the hip     IMAGES: 3     FINDINGS:     There is no acute fracture or dislocation  No degenerative changes  No lytic or blastic lesions are seen  There are atherosclerotic calcifications  Soft tissues are otherwise unremarkable  IMPRESSION:     No acute osseous abnormality         Workstation performed: [id number]     Signed by:   Kaylie Wilkins MD   8/18/17

## 2018-02-22 ENCOUNTER — OFFICE VISIT (OUTPATIENT)
Dept: FAMILY MEDICINE CLINIC | Facility: CLINIC | Age: 83
End: 2018-02-22
Payer: MEDICARE

## 2018-02-22 VITALS
WEIGHT: 137 LBS | RESPIRATION RATE: 20 BRPM | BODY MASS INDEX: 23.39 KG/M2 | SYSTOLIC BLOOD PRESSURE: 160 MMHG | HEART RATE: 92 BPM | HEIGHT: 64 IN | TEMPERATURE: 97.1 F | DIASTOLIC BLOOD PRESSURE: 78 MMHG

## 2018-02-22 DIAGNOSIS — H66.92 ACUTE LEFT OTITIS MEDIA: Primary | ICD-10-CM

## 2018-02-22 PROBLEM — R26.9 ABNORMAL GAIT: Status: ACTIVE | Noted: 2017-11-20

## 2018-02-22 PROCEDURE — 99213 OFFICE O/P EST LOW 20 MIN: CPT | Performed by: NURSE PRACTITIONER

## 2018-02-22 RX ORDER — AMOXICILLIN 500 MG/1
500 TABLET, FILM COATED ORAL 2 TIMES DAILY
Qty: 14 TABLET | Refills: 0 | Status: SHIPPED | OUTPATIENT
Start: 2018-02-22 | End: 2018-03-01

## 2018-02-22 NOTE — PROGRESS NOTES
Assessment/Plan:  Take medication with food  Can take some probiotic and yogurt with the medication  Supportive care discussed and advised  Follow up for no improvement and worsening of conditions  Patient advised and educated when to see immediate medical care  Avoid Q-tips  Advised dry ear precautions  If hearing issue still not resolve, will consult ENT for hearing  Diagnoses and all orders for this visit:    Acute left otitis media  -     amoxicillin (AMOXIL) 500 MG tablet; Take 1 tablet (500 mg total) by mouth 2 (two) times a day for 7 days  -     neomycin-polymyxin-hydrocortisone (CORTISPORIN) otic solution; Administer 4 drops into the left ear every 6 (six) hours for 7 days          Return if symptoms worsen or fail to improve  Subjective:      Patient ID: Susy Bird is a 80 y o  female  Chief Complaint   Patient presents with    Hearing Problem     Started yesterday with dificulty hearing out of her left ear       HPI  Patient stated that having muffled hearing in left ear from a day  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  Patient denies any injury, headache, dizziness  Not using any OTC  Accompanied by daughter  The following portions of the patient's history were reviewed and updated as appropriate: allergies, current medications, past family history, past medical history, past social history, past surgical history and problem list       Review of Systems   Constitutional: Negative for chills, fatigue and fever  HENT: Negative for congestion, ear pain, postnasal drip, rhinorrhea, sinus pain, sinus pressure, sneezing, sore throat and voice change  Hearing loss: Muffled hearing on left ear  Respiratory: Negative for cough, chest tightness, shortness of breath and wheezing  Cardiovascular: Negative  Gastrointestinal: Negative for abdominal pain, constipation, diarrhea and nausea  Neurological: Negative for dizziness and headaches  Objective:    History   Smoking Status    Former Smoker   Smokeless Tobacco    Never Used       Allergies: Allergies   Allergen Reactions    Omnipaque [Iohexol] Shortness Of Breath    Aspirin Rash       Vitals:  /78   Pulse 92   Temp (!) 97 1 °F (36 2 °C)   Resp 20   Ht 5' 4" (1 626 m)   Wt 62 1 kg (137 lb)   BMI 23 52 kg/m²     Current Outpatient Prescriptions   Medication Sig Dispense Refill    fluticasone (FLONASE) 50 mcg/act nasal spray 1 spray into each nostril daily   folic acid (FOLVITE) 946 mcg tablet Take 400 mcg by mouth daily   levalbuterol (XOPENEX) 0 63 mg/3 mL nebulizer solution Take 1 ampule by nebulization every 4 (four) hours as needed for wheezing   montelukast (SINGULAIR) 10 mg tablet Take 10 mg by mouth daily at bedtime   multivitamin (THERAGRAN) TABS Take 1 tablet by mouth daily   omega-3-acid ethyl esters (LOVAZA) 1 g capsule Take 2 g by mouth daily   sertraline (ZOLOFT) 25 mg tablet Take 25 mg by mouth daily   valsartan (DIOVAN) 80 mg tablet Take 160 mg by mouth 2 (two) times a day        Vitamin D, Cholecalciferol, 1000 UNITS CAPS Take 2,000 Units by mouth daily   amLODIPine (NORVASC) 5 mg tablet Take 5 mg by mouth daily   amoxicillin (AMOXIL) 500 MG tablet Take 1 tablet (500 mg total) by mouth 2 (two) times a day for 7 days 14 tablet 0    neomycin-polymyxin-hydrocortisone (CORTISPORIN) otic solution Administer 4 drops into the left ear every 6 (six) hours for 7 days 10 mL 0     No current facility-administered medications for this visit  Physical Exam   Constitutional: She is oriented to person, place, and time  She appears well-developed and well-nourished  HENT:   Right Ear: Tympanic membrane and ear canal normal    Left Ear: Ear canal normal  Tympanic membrane is erythematous and bulging  Nose: Nose normal  Right sinus exhibits no maxillary sinus tenderness and no frontal sinus tenderness   Left sinus exhibits no maxillary sinus tenderness and no frontal sinus tenderness  Mouth/Throat: Oropharynx is clear and moist and mucous membranes are normal    Cardiovascular: Normal rate, regular rhythm and normal heart sounds  Pulmonary/Chest: Effort normal and breath sounds normal    Musculoskeletal: Normal range of motion  Lymphadenopathy:        Right cervical: No superficial cervical and no posterior cervical adenopathy present  Left cervical: No superficial cervical and no posterior cervical adenopathy present  Neurological: She is alert and oriented to person, place, and time  Skin: Skin is warm and dry  Psychiatric: She has a normal mood and affect  Vitals reviewed          MAGDALENO Torres

## 2018-02-22 NOTE — PATIENT INSTRUCTIONS
Take medication with food  Can take some probiotic and yogurt with the medication  Supportive care discussed and advised  Follow up for no improvement and worsening of conditions  Patient advised and educated when to see immediate medical care  Avoid Q-tips  Advised dry ear precautions  If hearing issue still not resolve, will consult ENT for hearing  Otitis Media   AMBULATORY CARE:   Otitis media  is an ear infection  Common symptoms include the following:   · Fever or a headache    · Ear pain    · Trouble hearing    · Ear feels plugged or full or you have ringing or buzzing in your ear    · Dizziness or you lose your balance    · Nausea or vomiting  Seek immediate care for the following symptoms:   · Seizure    · Fever and a stiff neck  Treatment for otitis media  may include any of the following:  · NSAIDs , such as ibuprofen, help decrease swelling, pain, and fever  This medicine is available with or without a doctor's order  NSAIDs can cause stomach bleeding or kidney problems in certain people  If you take blood thinner medicine, always ask your healthcare provider if NSAIDs are safe for you  Always read the medicine label and follow directions  · Ear drops  to help treat your ear pain  · Antibiotics  to help kill the germs that caused your ear infection  Care for otitis media:   · Use heat  Place a warm, moist washcloth on your ear to decrease pain  Apply for 15 to 20 minutes, 3 to 4 times a day    · Use ice  Ice helps decrease swelling and pain  Use an ice pack or put crushed ice in a plastic bag  Cover the ice pack with a towel and place it on your ear for 15 to 20 minutes, 3 to 4 times a day for 2 days  Prevent otitis media:   · Wash your hands often  This will help prevent the spread of germs  Encourage everyone in your house to wash their hands with soap and water after they use the bathroom   Everyone should also wash their hands after they change a child's diaper and before they prepare or eat food  · Stay away from people who are ill  Germs are easily and quickly spread through contact  Follow up with your healthcare provider as directed:  Write down your questions so you remember to ask them during your visits  © 2017 [address] [employer] Information is for End User's use only and may not be sold, redistributed or otherwise used for commercial purposes  All illustrations and images included in CareNotes® are the copyrighted property of [employer]  or [employer]  The above information is an  only  It is not intended as medical advice for individual conditions or treatments  Talk to your doctor, nurse or pharmacist before following any medical regimen to see if it is safe and effective for you

## 2018-03-06 ENCOUNTER — TELEPHONE (OUTPATIENT)
Dept: FAMILY MEDICINE CLINIC | Facility: CLINIC | Age: 83
End: 2018-03-06

## 2018-03-06 DIAGNOSIS — H92.09 OTALGIA, UNSPECIFIED LATERALITY: Primary | ICD-10-CM

## 2018-03-06 RX ORDER — AZITHROMYCIN 250 MG/1
TABLET, FILM COATED ORAL
Qty: 6 TABLET | Refills: 0 | Status: SHIPPED | OUTPATIENT
Start: 2018-03-06 | End: 2018-03-11

## 2018-03-06 NOTE — TELEPHONE ENCOUNTER
Ann Marie Cisneros called again, hoping to get a new Rx for Kristel's ear  She still can't hear and is dizzy

## 2018-03-06 NOTE — TELEPHONE ENCOUNTER
PATRICIA     Patient was given an antibiotic and ear drops  She took her last antibiotic on Friday  Now she cant hear again  Please advise

## 2018-03-06 NOTE — TELEPHONE ENCOUNTER
Please advise  Who do you recommend so I can give pt the information when I speak with her   Clotemilia Call, [place]

## 2018-03-07 NOTE — TELEPHONE ENCOUNTER
I can send rx for Zithromax to her pharmacy but if that does not help in the next 4-5 days then she could be rechecked

## 2018-03-17 ENCOUNTER — OFFICE VISIT (OUTPATIENT)
Dept: URGENT CARE | Age: 83
End: 2018-03-17
Payer: MEDICARE

## 2018-03-17 VITALS
RESPIRATION RATE: 18 BRPM | WEIGHT: 136 LBS | BODY MASS INDEX: 23.34 KG/M2 | TEMPERATURE: 98.5 F | DIASTOLIC BLOOD PRESSURE: 82 MMHG | HEART RATE: 98 BPM | SYSTOLIC BLOOD PRESSURE: 185 MMHG

## 2018-03-17 DIAGNOSIS — H65.02 ACUTE SEROUS OTITIS MEDIA OF LEFT EAR, RECURRENCE NOT SPECIFIED: Primary | ICD-10-CM

## 2018-03-17 PROCEDURE — G0463 HOSPITAL OUTPT CLINIC VISIT: HCPCS | Performed by: FAMILY MEDICINE

## 2018-03-17 PROCEDURE — 99203 OFFICE O/P NEW LOW 30 MIN: CPT | Performed by: FAMILY MEDICINE

## 2018-03-17 RX ORDER — FLUTICASONE PROPIONATE 50 MCG
1 SPRAY, SUSPENSION (ML) NASAL DAILY
Qty: 16 G | Refills: 0 | Status: SHIPPED | OUTPATIENT
Start: 2018-03-17

## 2018-03-17 NOTE — PATIENT INSTRUCTIONS
Serous Otitis Media   WHAT YOU NEED TO KNOW:   Serous otitis media is fluid trapped behind your tympanic membrane (eardrum), without an ear infection  Your eardrum is in your middle ear  Serous otitis media is also called otitis media with effusion  You may have fluid in your ear for months, but it usually goes away on its own  The fluid may be in one or both ears  The fluid may cause muffled sounds, and you may feel like your ears are full  Serous otitis media may be caused by an upper respiratory infection or allergies  It is most common in the fall and early spring  DISCHARGE INSTRUCTIONS:   Return to the emergency department if:   · You have a fever  · You have a sudden loss of hearing in your affected ear  · You develop a severe headache and stiff neck  · You have a seizure  Contact your healthcare provider if:   · You have fluid draining from your ear  · You have new symptoms  · You have questions or concerns about your condition or care  Follow up with your healthcare provider as directed: Your ears will need to be checked regularly  You may need to see a specialist  Write down your questions so you remember to ask them during your visits  © 2017 [facility] [facility] Information is for End User's use only and may not be sold, redistributed or otherwise used for commercial purposes  All illustrations and images included in CareNotes® are the copyrighted property of [employer] , [employer]  or [facility]  The above information is an  only  It is not intended as medical advice for individual conditions or treatments  Talk to your doctor, nurse or pharmacist before following any medical regimen to see if it is safe and effective for you

## 2018-03-17 NOTE — PROGRESS NOTES
[facility] [employer] [facility]        NAME: Elizabeth Justin is a 80 y o  female  : 1925    MRN: [id number]  DATE: 2018  TIME: 4:29 PM    Assessment and Plan   Acute serous otitis media of left ear, recurrence not specified [H65 02]  1  Acute serous otitis media of left ear, recurrence not specified  fluticasone (FLONASE) 50 mcg/act nasal spray     Patient Instructions     Continue with flonase nasal spray daily  Follow up with PCP as scheduled  Pt encouraged to consider follow up with ENT if pain persists    Chief Complaint     Chief Complaint   Patient presents with    Earache     left ear pain since February  2 rounds od antibiotics and ear drops  Affecting her balance  History of Present Illness       81yo F p/w left ear pain "on and off" since   Patient seen by PCP and given amoxicillin po and neomycin drops for otitis media  Patient states pain improved significantly after this tx but pain did not resolve completely  Pts daughter called pcp on 3/6 about continued pain and pt was given zpack  Patient states this medicine did "nothing" for pain  Pain is described as sharp and does not radiate  Patient denies fever, ear discharge, other cold sxs  Patient endorses chronic nasal congestion  Earache    Pertinent negatives include no coughing, ear discharge or hearing loss  Review of Systems   Review of Systems   Constitutional: Negative for activity change, appetite change, chills, diaphoresis, fatigue, fever and unexpected weight change  HENT: Positive for congestion and ear pain  Negative for dental problem, drooling, ear discharge, facial swelling, hearing loss, mouth sores, nosebleeds and postnasal drip  Respiratory: Negative for apnea, cough, choking, chest tightness, shortness of breath, wheezing and stridor  Current Medications       Current Outpatient Prescriptions:     amLODIPine (NORVASC) 5 mg tablet, Take 5 mg by mouth daily  , Disp: , Rfl:    fluticasone (FLONASE) 50 mcg/act nasal spray, 1 spray into each nostril daily  , Disp: , Rfl:     folic acid (FOLVITE) 054 mcg tablet, Take 400 mcg by mouth daily  , Disp: , Rfl:     levalbuterol (XOPENEX) 0 63 mg/3 mL nebulizer solution, Take 1 ampule by nebulization every 4 (four) hours as needed for wheezing , Disp: , Rfl:     montelukast (SINGULAIR) 10 mg tablet, Take 10 mg by mouth daily at bedtime  , Disp: , Rfl:     multivitamin (THERAGRAN) TABS, Take 1 tablet by mouth daily  , Disp: , Rfl:     omega-3-acid ethyl esters (LOVAZA) 1 g capsule, Take 2 g by mouth daily  , Disp: , Rfl:     sertraline (ZOLOFT) 25 mg tablet, Take 25 mg by mouth daily  , Disp: , Rfl:     valsartan (DIOVAN) 80 mg tablet, Take 160 mg by mouth 2 (two) times a day  , Disp: , Rfl:     Vitamin D, Cholecalciferol, 1000 UNITS CAPS, Take 2,000 Units by mouth daily  , Disp: , Rfl:     fluticasone (FLONASE) 50 mcg/act nasal spray, 1 spray into each nostril daily, Disp: 16 g, Rfl: 0    neomycin-polymyxin-hydrocortisone (CORTISPORIN) otic solution, Administer 4 drops into the left ear every 6 (six) hours for 7 days, Disp: 10 mL, Rfl: 0    Current Allergies     Allergies as of 03/17/2018 - Reviewed 03/17/2018   Allergen Reaction Noted    Omnipaque [iohexol] Shortness Of Breath 09/02/2016    Aspirin Rash 09/02/2016            The following portions of the patient's history were reviewed and updated as appropriate: allergies, current medications, past family history, past medical history, past social history, past surgical history and problem list      Past Medical History:   Diagnosis Date    Asthma     History of transfusion     Hypertension     Psychiatric disorder        Past Surgical History:   Procedure Laterality Date    ABDOMINAL SURGERY      APPENDECTOMY      EYE SURGERY      FRACTURE SURGERY      HERNIA REPAIR      umbilical    HYSTERECTOMY         No family history on file        Medications have been verified  Objective   BP (!) 185/82   Pulse 98   Temp 98 5 °F (36 9 °C) (Temporal)   Resp 18   Wt 61 7 kg (136 lb)   BMI 23 34 kg/m²        Physical Exam     Physical Exam   Constitutional: She appears well-developed and well-nourished  HENT:   Head: Normocephalic  Right Ear: Tympanic membrane, external ear and ear canal normal  Decreased hearing is noted  Left Ear: External ear normal  A middle ear effusion (serous) is present  Decreased hearing is noted  Nose: Nose normal    Mouth/Throat: Oropharynx is clear and moist  No oropharyngeal exudate  Cardiovascular: Normal rate, regular rhythm, normal heart sounds and intact distal pulses  Exam reveals no gallop and no friction rub  No murmur heard  Pulmonary/Chest: Effort normal and breath sounds normal  No respiratory distress  She has no wheezes  She has no rales  She exhibits no tenderness  Abdominal: Soft  She exhibits no distension  There is no tenderness

## 2018-04-30 ENCOUNTER — OFFICE VISIT (OUTPATIENT)
Dept: FAMILY MEDICINE CLINIC | Facility: CLINIC | Age: 83
End: 2018-04-30
Payer: MEDICARE

## 2018-04-30 VITALS
HEART RATE: 84 BPM | RESPIRATION RATE: 18 BRPM | TEMPERATURE: 98.2 F | DIASTOLIC BLOOD PRESSURE: 80 MMHG | BODY MASS INDEX: 22.88 KG/M2 | SYSTOLIC BLOOD PRESSURE: 142 MMHG | HEIGHT: 64 IN | WEIGHT: 134 LBS

## 2018-04-30 DIAGNOSIS — J45.30 MILD PERSISTENT ASTHMA WITHOUT COMPLICATION: ICD-10-CM

## 2018-04-30 DIAGNOSIS — E78.5 HYPERLIPIDEMIA, UNSPECIFIED HYPERLIPIDEMIA TYPE: ICD-10-CM

## 2018-04-30 DIAGNOSIS — K42.9 UMBILICAL HERNIA WITHOUT OBSTRUCTION AND WITHOUT GANGRENE: ICD-10-CM

## 2018-04-30 DIAGNOSIS — I10 ESSENTIAL HYPERTENSION: ICD-10-CM

## 2018-04-30 DIAGNOSIS — Z00.00 MEDICARE ANNUAL WELLNESS VISIT, SUBSEQUENT: Primary | ICD-10-CM

## 2018-04-30 DIAGNOSIS — F32.A DEPRESSION, UNSPECIFIED DEPRESSION TYPE: ICD-10-CM

## 2018-04-30 PROBLEM — J45.901 ASTHMA EXACERBATION: Status: RESOLVED | Noted: 2017-03-04 | Resolved: 2018-04-30

## 2018-04-30 PROCEDURE — G0439 PPPS, SUBSEQ VISIT: HCPCS | Performed by: INTERNAL MEDICINE

## 2018-04-30 PROCEDURE — 99214 OFFICE O/P EST MOD 30 MIN: CPT | Performed by: INTERNAL MEDICINE

## 2018-04-30 NOTE — PATIENT INSTRUCTIONS

## 2018-04-30 NOTE — ASSESSMENT & PLAN NOTE
This is unchanged  Discussed signs/symptoms of when to seek immediate medical care  She is not interested in surgical consultation to discuss possible repair

## 2018-04-30 NOTE — PROGRESS NOTES
Subjective:      Patient ID: Yu Almaraz is a 80 y o  female  Chief Complaint   Patient presents with    Follow-up     ear problems  and discuss pulmonary issues Dr Iván Gilmore retired  Joaquin Garcia       She is here for follow up of L ear clogging  Had this from February to April  This is now resolved  Needs a new pulmonologist since Dr Iván Gilmore retired  Had been given information for Dr Isis Rico  Denies any complaints currently  Would like hernia rechecked  This has not changed, no pain or redness, bowels moving regularly  Appetite is good  The following portions of the patient's history were reviewed and updated as appropriate: allergies, current medications, past family history, past medical history, past social history, past surgical history and problem list     Review of Systems   Constitutional: Negative  Respiratory: Negative  Cardiovascular: Negative  Musculoskeletal: Negative  Current Outpatient Prescriptions   Medication Sig Dispense Refill    fluticasone (FLONASE) 50 mcg/act nasal spray 1 spray into each nostril daily 16 g 0    folic acid (FOLVITE) 972 mcg tablet Take 400 mcg by mouth daily   levalbuterol (XOPENEX) 0 63 mg/3 mL nebulizer solution Take 1 ampule by nebulization every 4 (four) hours as needed for wheezing   montelukast (SINGULAIR) 10 mg tablet Take 10 mg by mouth daily at bedtime   multivitamin (THERAGRAN) TABS Take 1 tablet by mouth daily   omega-3-acid ethyl esters (LOVAZA) 1 g capsule Take 2 g by mouth daily   sertraline (ZOLOFT) 25 mg tablet Take 25 mg by mouth daily   valsartan (DIOVAN) 80 mg tablet Take 160 mg by mouth 2 (two) times a day        Vitamin D, Cholecalciferol, 1000 UNITS CAPS Take 2,000 Units by mouth daily        neomycin-polymyxin-hydrocortisone (CORTISPORIN) otic solution Administer 4 drops into the left ear every 6 (six) hours for 7 days 10 mL 0     No current facility-administered medications for this visit  Objective:    /80   Pulse 84   Temp 98 2 °F (36 8 °C)   Resp 18   Ht 5' 4" (1 626 m)   Wt 60 8 kg (134 lb)   BMI 23 00 kg/m²        Physical Exam   Constitutional: She appears well-developed and well-nourished  Eyes: Conjunctivae are normal    Neck: Neck supple  No JVD present  No thyromegaly present  Cardiovascular: Normal rate, regular rhythm, normal heart sounds and intact distal pulses  Exam reveals no gallop and no friction rub  No murmur heard  Pulmonary/Chest: Effort normal and breath sounds normal  She has no wheezes  She has no rales  Abdominal: Soft  Bowel sounds are normal  She exhibits no distension  There is no tenderness  Large umbilical and incisional hernia, easily reducible and non tender  Musculoskeletal: She exhibits no edema  Assessment/Plan:    Mild persistent asthma without complication  She will see Dr Reynold Renee in follow up  Currently without symptoms  HTN (hypertension)  Stable, continue medications  Hyperlipidemia  Stable, due for yearly labs in November  Continue low fat diet  Depression  Stable on low dose sertraline  Umbilical hernia  This is unchanged  Discussed signs/symptoms of when to seek immediate medical care  She is not interested in surgical consultation to discuss possible repair  Diagnoses and all orders for this visit:    Medicare annual wellness visit, subsequent    Essential hypertension    Hyperlipidemia, unspecified hyperlipidemia type    Depression, unspecified depression type    Mild persistent asthma without complication    Umbilical hernia without obstruction and without gangrene          No Follow-up on file         Nicki Alcantar MD

## 2018-04-30 NOTE — PROGRESS NOTES
[facility] Clinical     ISAR:   Previous hospitalizations?:  No       Once in a Lifetime Medicare Screening:   EKG performed?:  Yes        Medicare Screening Tests and Risk Assessment:   AAA Risk Assessment    None Indicated:  Yes    Osteoporosis Risk Assessment    :  Yes    Age over 48:  Yes    HIV Risk Assessment    None indicated:  Yes        Drug and Alcohol Use:   Tobacco use    Cigarettes:  never smoker    Tobacco use duration    Tobacco Cessation Readiness    Alcohol use    Alcohol use:  never    Alcohol Treatment Readiness   Illicit Drug Use    Drug use:  never    Drug type:  no sedative use       Diet & Exercise:   Diet   What is your diet?:  Regular   How many servings a day of the following:   Exercise    Do you currently exercise?:  yes    Frequency:  daily    Minutes per day:  30    Type of exercise:  walking       Cognitive Impairment Screening:   Anxiety screenings preformed:   Yes Anxiety screen score:  0   Depression screening preformed:  Yes Depression screen score:  0   Cognitive Impairment Screening    Do you have difficulty learning or retaining new information?:  No Do you have difficulty handling new tasks?:  No   Do you have difficulty with reasoning?:  No Do you have difficulty with spatial ability and orientation?:  No   Do you have difficulty with language?:  No Do you have difficulty with behavior?:  No       Functional Ability/Level of Safety:   Hearing    Hearing difficulties:  Yes Bilateral:  slightly decreased   Hearing aid:  No    Hearing Impairment Assessment    Current Activities    Status:  unlimited ADL's, unlimited IADL's, no driving, unlimited social activities   Help needed with the folllowing:    Using the phone:  No Transportation:  Yes   Shopping:  No Preparing Meals:  No   Doing Housework:  No Doing Laundry:  Yes   Managing Medications:  No Managing Money:  No   ADL    Fall Risk   Have you fallen in the last 12 months?:  Yes    How many times?:  1    Injury History Polypharmacy:  No Antidepressant Use:  No   Sedative Use:  No Antihypertensive Use:  Yes   Previous Fall:  No Alcohol Use:  No   Deconditioning:  No Visual Impairment:  No   Cogitive Impairment:  No Mmobility Impairment:  No   Postural Hypotension:  No Urinary Incontinence:  No       Home Safety:   Home Safety Risk Factors   Unfamilar with surroundings:  No Uneven floors:  No   Stairs or handrail saftey risk:  No Loose rugs:  No   Household clutter:  No Poor household lighting:  No   No grab bars in bathroom:  No Further evaluation needed:  No       Advanced Directives:   Advanced Directives    Living Will:  Yes Durable POA for healthcare: Yes   Advanced directive:  Yes    Patient's End of Life Decisions        Urinary Incontinence:   Do you have urinary incontinence?:  No        Glaucoma:             HPI:  Emerald Rangel is a 80 y o  female here for her Subsequent Wellness Visit  Patient Active Problem List   Diagnosis    HTN (hypertension)    Depression    Anxiety    Abnormal gait    Chronic rhinitis    Herpes zoster with nervous system complication    Hyperlipidemia    Post-menopausal osteoporosis    Umbilical hernia    Mild persistent asthma without complication     Past Medical History:   Diagnosis Date    Asthma     History of transfusion     Hypertension     Psychiatric disorder      Past Surgical History:   Procedure Laterality Date    ABDOMINAL SURGERY      APPENDECTOMY      EYE SURGERY      FRACTURE SURGERY      HERNIA REPAIR      umbilical    HYSTERECTOMY       No family history on file    History   Smoking Status    Former Smoker   Smokeless Tobacco    Never Used     History   Alcohol Use No      History   Drug Use No     /80   Pulse 84   Temp 98 2 °F (36 8 °C)   Resp 18   Ht 5' 4" (1 626 m)   Wt 60 8 kg (134 lb)   BMI 23 00 kg/m²       Current Outpatient Prescriptions   Medication Sig Dispense Refill    fluticasone (FLONASE) 50 mcg/act nasal spray 1 spray into each nostril daily 16 g 0    folic acid (FOLVITE) 308 mcg tablet Take 400 mcg by mouth daily   levalbuterol (XOPENEX) 0 63 mg/3 mL nebulizer solution Take 1 ampule by nebulization every 4 (four) hours as needed for wheezing   montelukast (SINGULAIR) 10 mg tablet Take 10 mg by mouth daily at bedtime   multivitamin (THERAGRAN) TABS Take 1 tablet by mouth daily   omega-3-acid ethyl esters (LOVAZA) 1 g capsule Take 2 g by mouth daily   sertraline (ZOLOFT) 25 mg tablet Take 25 mg by mouth daily   valsartan (DIOVAN) 80 mg tablet Take 160 mg by mouth 2 (two) times a day        Vitamin D, Cholecalciferol, 1000 UNITS CAPS Take 2,000 Units by mouth daily   neomycin-polymyxin-hydrocortisone (CORTISPORIN) otic solution Administer 4 drops into the left ear every 6 (six) hours for 7 days 10 mL 0     No current facility-administered medications for this visit        Allergies   Allergen Reactions    Omnipaque [Iohexol] Shortness Of Breath    Aspirin Rash     Immunization History   Administered Date(s) Administered    Influenza Split High Dose Preservative Free IM 10/07/2013, 10/02/2014, 08/31/2015, 09/22/2016    Pneumococcal Conjugate 13-Valent 07/20/2016    Pneumococcal Polysaccharide PPV23 09/21/2009       Patient Care Team:  Nadege Ospina MD as PCP - MD Nadege Keys MD Arn Pimple, MD      Medicare Screening Tests and Risk Assessments:  [facility] Clinical     ISAR:   Previous hospitalizations?:  No       Once in a Lifetime Medicare Screening:   EKG performed?:  Yes        Medicare Screening Tests and Risk Assessment:   AAA Risk Assessment    None Indicated:  Yes    Osteoporosis Risk Assessment    :  Yes    Age over 48:  Yes    HIV Risk Assessment    None indicated:  Yes        Drug and Alcohol Use:   Tobacco use    Cigarettes:  never smoker    Tobacco use duration    Tobacco Cessation Readiness    Alcohol use    Alcohol use:  never    Alcohol Treatment Readiness   Illicit Drug Use    Drug use:  never    Drug type:  no sedative use       Diet & Exercise:   Diet   What is your diet?:  Regular   How many servings a day of the following:   Exercise    Do you currently exercise?:  yes    Frequency:  daily    Minutes per day:  30    Type of exercise:  walking       Cognitive Impairment Screening:   Anxiety screenings preformed:   Yes Anxiety screen score:  0   Depression screening preformed:  Yes Depression screen score:  0   Cognitive Impairment Screening    Do you have difficulty learning or retaining new information?:  No Do you have difficulty handling new tasks?:  No   Do you have difficulty with reasoning?:  No Do you have difficulty with spatial ability and orientation?:  No   Do you have difficulty with language?:  No Do you have difficulty with behavior?:  No       Functional Ability/Level of Safety:   Hearing    Hearing difficulties:  Yes Bilateral:  slightly decreased   Hearing aid:  No    Hearing Impairment Assessment    Current Activities    Status:  unlimited ADL's, unlimited IADL's, no driving, unlimited social activities   Help needed with the folllowing:    Using the phone:  No Transportation:  Yes   Shopping:  No Preparing Meals:  No   Doing Housework:  No Doing Laundry:  Yes   Managing Medications:  No Managing Money:  No   ADL    Fall Risk   Have you fallen in the last 12 months?:  Yes    How many times?:  1    Injury History   Polypharmacy:  No Antidepressant Use:  No   Sedative Use:  No Antihypertensive Use:  Yes   Previous Fall:  No Alcohol Use:  No   Deconditioning:  No Visual Impairment:  No   Cogitive Impairment:  No Mmobility Impairment:  No   Postural Hypotension:  No Urinary Incontinence:  No       Home Safety:   Home Safety Risk Factors   Unfamilar with surroundings:  No Uneven floors:  No   Stairs or handrail saftey risk:  No Loose rugs:  No   Household clutter:  No Poor household lighting:  No   No grab bars in bathroom:  No Further evaluation needed:  No       Advanced Directives:   Advanced Directives    Living Will:  Yes Durable POA for healthcare: Yes   Advanced directive:  Yes    Patient's End of Life Decisions        Urinary Incontinence:   Do you have urinary incontinence?:  No        Glaucoma:            Provider Screening     Preventative Screening/Counseling:   Cardiovascular Screening/Counseling:   (Labs Q5 years, EKG optional one-time)   General:  Risks and Benefits Discussed, Screening Current Counseling:  Healthy Diet, Healthy Weight, Improve Cholesterol          Diabetes Screening/Counseling:   (2 tests/year if Pre-Diabetes or 1 test/year if no Diabetes)   General:  Risks and Benefits Discussed, Screening Current           Colorectal Cancer Screening/Counseling:   (FOBT Q1 yr; Flex Sig Q4 yrs or Q10 yrs after Screening Colonoscopy; Screening Colonoscpy Q2 yrs High Risk or Q10 yrs Low Risk; Barium Enema Q2 yrs High Risk or Q4 yrs Low Risk)   General:  Screening Not Indicated           Prostate Cancer Screening/Counseling:   (Annual)          Breast Cancer Screening/Counseling:   (Baseline Age 28 - 43; Annual Age 36+)   General:  Screening Not Indicated          Cervical Cancer Screening/Counseling:   (Annual for High Risk or Childbearing Age with Abnormal Pap in Last 3 yrs; Every 2 all others)   General:  Screening Not Indicated           Osteoporosis Screening/Counseling:   (Every 2 Yrs if at risk or more if medically necessary)   General:  Patient Declines           AAA Screening/Counseling:   (Once per Lifetime with risk factors)    General:  Screening Not Indicated           Glaucoma Screening/Counseling:   (Annual)   General:  Screening Current          HIV Screening/Counseling:   (Voluntary;  Once annually for high risk OR 3 times for Pregnancy at diagnosis of IUP; 3rd trimester; and at Labor   General:  Screening Not Indicated           Hepatitis C Screening:             Immunizations:   Influenza (annual): Influenza UTD This Year   Pneumococcal (Once in a Lifetime):  Lifetime Vaccine Completed       Other Preventative Couseling (Non-Medicare Wellness Visit Required):       Referrals (Non-Medicare Wellness Visit Required):       Medical Equipment/Suppliers:           No exam data present  Reviewed Updated [facility] Prior Wellness Visits:   Last Medicare wellness visit information was reviewed, patient interviewed , no change since last AWVyes  Last Medicare wellness visit information was reviewed, patient interviewed and updates made to the record today yes    Assessment and Plan:  1  Medicare annual wellness visit, subsequent     2  Essential hypertension     3  Hyperlipidemia, unspecified hyperlipidemia type     4  Depression, unspecified depression type     5  Mild persistent asthma without complication     6   Umbilical hernia without obstruction and without gangrene         Health Maintenance Due   Topic Date Due    Depression Screening PHQ-9  05/28/1937    DTaP,Tdap,and Td Vaccines (1 - Tdap) [birth year]/1946    Fall Risk  05/28/1990    Urinary Incontinence Screening  05/28/1990    GLAUCOMA SCREENING 67+ YR  05/28/1992    INFLUENZA VACCINE  09/01/2017

## 2018-06-27 ENCOUNTER — OFFICE VISIT (OUTPATIENT)
Dept: PULMONOLOGY | Facility: MEDICAL CENTER | Age: 83
End: 2018-06-27
Payer: MEDICARE

## 2018-06-27 DIAGNOSIS — J45.40 MODERATE PERSISTENT ASTHMA WITHOUT COMPLICATION: ICD-10-CM

## 2018-06-27 DIAGNOSIS — R06.02 SHORTNESS OF BREATH ON EXERTION: Primary | ICD-10-CM

## 2018-06-27 DIAGNOSIS — F32.A DEPRESSION, UNSPECIFIED DEPRESSION TYPE: Primary | ICD-10-CM

## 2018-06-27 PROBLEM — J45.909 MODERATE ASTHMA: Status: ACTIVE | Noted: 2018-06-27

## 2018-06-27 PROCEDURE — 99214 OFFICE O/P EST MOD 30 MIN: CPT | Performed by: NURSE PRACTITIONER

## 2018-06-27 PROCEDURE — 94010 BREATHING CAPACITY TEST: CPT | Performed by: NURSE PRACTITIONER

## 2018-06-27 RX ORDER — VALSARTAN 160 MG/1
TABLET ORAL
COMMUNITY
Start: 2018-05-14 | End: 2018-07-12 | Stop reason: DRUGHIGH

## 2018-06-27 RX ORDER — LANOLIN ALCOHOL/MO/W.PET/CERES
1 CREAM (GRAM) TOPICAL 3 TIMES DAILY
COMMUNITY

## 2018-06-27 NOTE — PROGRESS NOTES
Assessment/Plan:       Problem List Items Addressed This Visit        Respiratory    Moderate asthma     Mansoor Silva has mild to moderate asthma  She had PFT done in the office today showing moderate obstructive defect  Forced vital capacity was 1 66 L or 83% of predicted, FEV1 0 84 50% obstruction ratio was 51%  She has been doing well using only rescue inhalation was Xopenex as needed  She uses this on an as needed basis and did use twice a day  She was hospitalized in March 2017 for asthma exacerbation and did well  She does not need nor has she ever been prescribed for supplemental oxygen at home  Room air oxygen at rest is 95%  On room air  Overall she is stable  He is accompanied today with her daughter  Mansoor Silva will continue to follow with pulmonologist in [place]  Apparently she is moving in 1 month  Other Visit Diagnoses     Shortness of breath on exertion    -  Primary    Relevant Orders    POCT spirometry (Completed)            No Follow-up on file  All questions are answered to the patient's satisfaction and understanding  She verbalizes understanding  She is encouraged to call with any further questions or concerns  Portions of the record may have been created with voice recognition software  Occasional wrong word or "sound a like" substitutions may have occurred due to the inherent limitations of voice recognition software  Read the chart carefully and recognize, using context, where substitutions have occurred  Electronically Signed by MAGDALENO Aleman    ______________________________________________________________________    Chief Complaint:   Chief Complaint   Patient presents with    Asthma     Former pt of Dr Jann Macias        Patient ID: Mansoor Silva is a 80 y o  y o  female has a past medical history of Asthma; History of transfusion; Hypertension; and Psychiatric disorder  6/27/2018  Mansoor Silva is a 80-year-old female with history of asthma    She has followed in the past with Dr Junior Gautam recently retired  Last date of service was from August 10, 2016  In noted that she had had a asthma exacerbation at that time and was prescribed tapering dose of prednisone  She also has Xopenex HFA that she uses as nail needed and also singular 10 mg daily  Last pulmonary function test availability is from August 10, 2016 [facility] was 1 34 L or 69% of predicted, FEV1 was 0 88 L or 57% of predicted and obstruction ratio was 61% flow volume loop shows moderate obstructive and restrictive impairment  Apparently she was hospitalized in March 2017 for acute exacerbation of bronchial asthma  She was treated with IV Solu-Medrol, Xopenex and at that time had supplemental oxygen  Last chest x-ray was March 4, 2017 that showed no active pulmonary disease  She follows with her primary care doctor and last no was from April 2017  It is noted that she has not used any supplemental oxygen at home  She does use a walker  He also follows with Cardiology; last 2D echo was done June 26, 2018  Ejection fraction was 70%  There was impaired diastolic filling relaxation  Mild mitral annular calcification was noted with trace mitral regurgitation  Trace tricuspid regurgitation was noted with right ventricular systolic pressure normal less than 30 mm of mercury  Also noted to have mild aortic valve sclerosis without stenosis  Asthma   She complains of shortness of breath  This is a chronic problem  The current episode started more than 1 year ago  The problem has been unchanged  Associated symptoms include postnasal drip  Her symptoms are aggravated by exercise  Her symptoms are alleviated by leukotriene antagonist and beta-agonist  She reports minimal improvement on treatment  Her symptoms are not alleviated by beta-agonist  Her past medical history is significant for asthma  Occupational/Exposure history:  Travel history:  Review of Systems   Constitutional: Negative      HENT: Positive for postnasal drip     Eyes: Negative  Respiratory: Positive for shortness of breath  Cardiovascular: Negative  Gastrointestinal: Negative  Endocrine: Negative  Musculoskeletal: Negative  Skin: Negative  Allergic/Immunologic: Negative  Neurological: Negative  Hematological: Negative  Psychiatric/Behavioral: Negative  Social history: She reports that she has quit smoking  She has never used smokeless tobacco  She reports that she does not drink alcohol or use drugs  Past surgical history:   Past Surgical History:   Procedure Laterality Date    ABDOMINAL SURGERY      APPENDECTOMY      EYE SURGERY      FRACTURE SURGERY      HERNIA REPAIR      umbilical    HYSTERECTOMY       Family history: No family history on file  Immunization History   Administered Date(s) Administered    Influenza Split High Dose Preservative Free IM 10/07/2013, 10/02/2014, 08/31/2015, 09/22/2016    Pneumococcal Conjugate 13-Valent 07/20/2016    Pneumococcal Polysaccharide PPV23 09/21/2009     Current Outpatient Prescriptions   Medication Sig Dispense Refill    fluticasone (FLONASE) 50 mcg/act nasal spray 1 spray into each nostril daily 16 g 0    folic acid (FOLVITE) 206 mcg tablet Take 400 mcg by mouth daily   glucosamine-chondroitin 500-400 MG tablet Take 1 tablet by mouth 3 (three) times a day      levalbuterol (XOPENEX) 0 63 mg/3 mL nebulizer solution Take 1 ampule by nebulization every 4 (four) hours as needed for wheezing   montelukast (SINGULAIR) 10 mg tablet Take 10 mg by mouth daily at bedtime   multivitamin (THERAGRAN) TABS Take 1 tablet by mouth daily   omega-3-acid ethyl esters (LOVAZA) 1 g capsule Take 2 g by mouth daily   sertraline (ZOLOFT) 25 mg tablet Take 25 mg by mouth daily   valsartan (DIOVAN) 80 mg tablet Take 160 mg by mouth 2 (two) times a day        Vitamin D, Cholecalciferol, 1000 UNITS CAPS Take 2,000 Units by mouth daily        neomycin-polymyxin-hydrocortisone (CORTISPORIN) otic solution Administer 4 drops into the left ear every 6 (six) hours for 7 days 10 mL 0    valsartan (DIOVAN) 160 mg tablet        No current facility-administered medications for this visit  Allergies: Omnipaque [iohexol] and Aspirin    Objective: There were no vitals filed for this visit     Wt Readings from Last 3 Encounters:   04/30/18 60 8 kg (134 lb)   03/17/18 61 7 kg (136 lb)   02/22/18 62 1 kg (137 lb)     There is no height or weight on file to calculate BMI  Physical Exam   Constitutional: She is oriented to person, place, and time  She appears well-developed and well-nourished  HENT:   Head: Normocephalic and atraumatic  Mallampati 3   Eyes: Conjunctivae are normal  Pupils are equal, round, and reactive to light  Neck: Normal range of motion  Neck supple  Cardiovascular: Normal rate and regular rhythm  Pulmonary/Chest: Effort normal and breath sounds normal    Abdominal: Soft  Musculoskeletal: Normal range of motion  Neurological: She is alert and oriented to person, place, and time  Skin: Skin is warm and dry  Psychiatric: She has a normal mood and affect  Her behavior is normal  Thought content normal        Lab Review:   No visits with results within 6 Month(s) from this visit     Latest known visit with results is:   Appointment on 11/17/2017   Component Date Value    WBC 11/17/2017 7 50     RBC 11/17/2017 5 10     Hemoglobin 11/17/2017 15 1     Hematocrit 11/17/2017 46 0     MCV 11/17/2017 90     MCH 11/17/2017 29 6     MCHC 11/17/2017 32 9     RDW 11/17/2017 13 1     MPV 11/17/2017 8 1*    Platelets 70/91/2047 207     Neutrophils Relative 11/17/2017 71     Lymphocytes Relative 11/17/2017 18     Monocytes Relative 11/17/2017 7     Eosinophils Relative 11/17/2017 3     Basophils Relative 11/17/2017 1     Neutrophils Absolute 11/17/2017 5 30     Lymphocytes Absolute 11/17/2017 1 30     Monocytes Absolute 11/17/2017 0 60     Eosinophils Absolute 11/17/2017 0 20     Basophils Absolute 11/17/2017 0 10     Sodium 11/17/2017 138     Potassium 11/17/2017 3 7     Chloride 11/17/2017 101     CO2 11/17/2017 28     Anion Gap 11/17/2017 9     BUN 11/17/2017 14     Creatinine 11/17/2017 0 65     Glucose, Fasting 11/17/2017 124*    Calcium 11/17/2017 9 4     AST 11/17/2017 20     ALT 11/17/2017 26     Alkaline Phosphatase 11/17/2017 72     Total Protein 11/17/2017 7 7     Albumin 11/17/2017 3 7     Total Bilirubin 11/17/2017 0 50     eGFR 11/17/2017 77     Cholesterol 11/17/2017 254*    Triglycerides 11/17/2017 82     HDL, Direct 11/17/2017 64*    LDL Calculated 11/17/2017 174*       Diagnostics:  I have personally reviewed pertinent reports  Office Spirometry Results:  FEV1: 0 84 liters (58%)  FVC: 1 66 liters (83%)  FEV1/FVC: 50 6 %  ESS:    No results found

## 2018-06-27 NOTE — ASSESSMENT & PLAN NOTE
Scooby Renner has mild to moderate asthma  She had PFT done in the office today showing moderate obstructive defect  Forced vital capacity was 1 66 L or 83% of predicted, FEV1 0 84 50% obstruction ratio was 51%  She has been doing well using only rescue inhalation was Xopenex as needed  She uses this on an as needed basis and did use twice a day  She was hospitalized in March 2017 for asthma exacerbation and did well  She does not need nor has she ever been prescribed for supplemental oxygen at home  Room air oxygen at rest is 95%  On room air  Overall she is stable  He is accompanied today with her daughter  Scooby Renner will continue to follow with pulmonologist in [place]  Apparently she is moving in 1 month

## 2018-06-27 NOTE — PATIENT INSTRUCTIONS
You have moderate asthma that is controlled  Continue using her Xopenex as needed  U will be in a more unit condition so air conditioning is suggested

## 2018-06-28 RX ORDER — SERTRALINE HYDROCHLORIDE 25 MG/1
TABLET, FILM COATED ORAL
Qty: 90 TABLET | Refills: 1 | Status: SHIPPED | OUTPATIENT
Start: 2018-06-28

## 2018-07-02 ENCOUNTER — TELEPHONE (OUTPATIENT)
Dept: FAMILY MEDICINE CLINIC | Facility: CLINIC | Age: 83
End: 2018-07-02

## 2018-07-02 DIAGNOSIS — F41.9 ANXIETY: Primary | ICD-10-CM

## 2018-07-02 RX ORDER — ALPRAZOLAM 0.25 MG/1
0.25 TABLET ORAL 3 TIMES DAILY PRN
Qty: 30 TABLET | Refills: 0 | Status: SHIPPED | OUTPATIENT
Start: 2018-07-02 | End: 2018-07-12 | Stop reason: SDUPTHER

## 2018-07-02 NOTE — TELEPHONE ENCOUNTER
DR GARRISON [facility]   Patient is moving and is very nervous  They are asking if we could give xanax for the move  Please advise

## 2018-07-09 NOTE — PROGRESS NOTES
Subjective:      Patient ID: Osie Runner is a 80 y o  female  Chief Complaint   Patient presents with    Follow-up     medication use--lj       She is moving to [place] with her daughter, 7/23/18  She feels well but is having some anxiety over the move  The following portions of the patient's history were reviewed and updated as appropriate: allergies, current medications, past family history, past medical history, past social history, past surgical history and problem list     Review of Systems   Constitutional: Negative  Respiratory: Negative  Cardiovascular: Negative  Current Outpatient Prescriptions   Medication Sig Dispense Refill    ALPRAZolam (XANAX) 0 25 mg tablet Take 1 tablet (0 25 mg total) by mouth 3 (three) times a day as needed for anxiety 60 tablet 0    fluticasone (FLONASE) 50 mcg/act nasal spray 1 spray into each nostril daily 16 g 0    folic acid (FOLVITE) 044 mcg tablet Take 400 mcg by mouth daily   glucosamine-chondroitin 500-400 MG tablet Take 1 tablet by mouth 3 (three) times a day      levalbuterol (XOPENEX) 0 63 mg/3 mL nebulizer solution Take 1 ampule by nebulization every 4 (four) hours as needed for wheezing   montelukast (SINGULAIR) 10 mg tablet Take 10 mg by mouth daily at bedtime   multivitamin (THERAGRAN) TABS Take 1 tablet by mouth daily   omega-3-acid ethyl esters (LOVAZA) 1 g capsule Take 2 g by mouth daily   sertraline (ZOLOFT) 25 mg tablet TAKE ONE TABLET BY MOUTH ONCE DAILY 90 tablet 1    valsartan (DIOVAN) 80 mg tablet Take 160 mg by mouth 2 (two) times a day        Vitamin D, Cholecalciferol, 1000 UNITS CAPS Take 2,000 Units by mouth daily   neomycin-polymyxin-hydrocortisone (CORTISPORIN) otic solution Administer 4 drops into the left ear every 6 (six) hours for 7 days 10 mL 0     No current facility-administered medications for this visit          Objective:    /60   Pulse 84   Temp 98 3 °F (36 8 °C)   Resp 16   Ht 5' 4" (1 626 m)   Wt 60 3 kg (133 lb)   BMI 22 83 kg/m²        Physical Exam   Constitutional: She appears well-developed and well-nourished  Eyes: Conjunctivae are normal    Neck: Neck supple  No JVD present  No thyromegaly present  Cardiovascular: Normal rate, regular rhythm, normal heart sounds and intact distal pulses  Exam reveals no gallop and no friction rub  No murmur heard  Pulmonary/Chest: Effort normal and breath sounds normal  She has no wheezes  She has no rales  Abdominal: Soft  Bowel sounds are normal  She exhibits no distension  There is no tenderness  5cm umbilical hernia, easily reducible   Musculoskeletal: She exhibits no edema  Assessment/Plan:    Anxiety  Continue sertraline 25 mg and xanax PRN, especially in light of upcoming move  HTN (hypertension)  Hypertension stable, continue meds  Diagnoses and all orders for this visit:    Essential hypertension    Anxiety  -     ALPRAZolam (XANAX) 0 25 mg tablet; Take 1 tablet (0 25 mg total) by mouth 3 (three) times a day as needed for anxiety          Return if symptoms worsen or fail to improve         Rosa Colón MD

## 2018-07-12 ENCOUNTER — OFFICE VISIT (OUTPATIENT)
Dept: FAMILY MEDICINE CLINIC | Facility: CLINIC | Age: 83
End: 2018-07-12
Payer: MEDICARE

## 2018-07-12 VITALS
SYSTOLIC BLOOD PRESSURE: 126 MMHG | HEART RATE: 84 BPM | DIASTOLIC BLOOD PRESSURE: 60 MMHG | TEMPERATURE: 98.3 F | WEIGHT: 133 LBS | HEIGHT: 64 IN | RESPIRATION RATE: 16 BRPM | BODY MASS INDEX: 22.71 KG/M2

## 2018-07-12 DIAGNOSIS — I10 ESSENTIAL HYPERTENSION: Primary | ICD-10-CM

## 2018-07-12 DIAGNOSIS — F41.9 ANXIETY: ICD-10-CM

## 2018-07-12 PROCEDURE — 99213 OFFICE O/P EST LOW 20 MIN: CPT | Performed by: INTERNAL MEDICINE

## 2018-07-12 RX ORDER — ALPRAZOLAM 0.25 MG/1
0.25 TABLET ORAL 3 TIMES DAILY PRN
Qty: 60 TABLET | Refills: 0 | Status: SHIPPED | OUTPATIENT
Start: 2018-07-12

## 2018-07-27 ENCOUNTER — TELEPHONE (OUTPATIENT)
Dept: FAMILY MEDICINE CLINIC | Facility: CLINIC | Age: 83
End: 2018-07-27

## 2018-07-27 DIAGNOSIS — I10 ESSENTIAL HYPERTENSION: Primary | ICD-10-CM

## 2018-07-27 NOTE — TELEPHONE ENCOUNTER
[facility]  sent a fax needing recall  clarification for a replacement for Valsartan     Please review   Thank you  garth

## 2018-07-30 ENCOUNTER — TELEPHONE (OUTPATIENT)
Dept: FAMILY MEDICINE CLINIC | Facility: CLINIC | Age: 83
End: 2018-07-30

## 2018-07-30 RX ORDER — LOSARTAN POTASSIUM 100 MG/1
100 TABLET ORAL 2 TIMES DAILY
Qty: 60 TABLET | Refills: 0
Start: 2018-07-30 | End: 2018-08-01 | Stop reason: SDUPTHER

## 2018-07-30 NOTE — TELEPHONE ENCOUNTER
OK to give # 60 with one refill, but she has moved to [place] with her daughter  Please find out where I should send this    I changed to losartan on her med list

## 2018-07-30 NOTE — TELEPHONE ENCOUNTER
Received generated refill request for pts valsartan 160 mg 1 tablet twice a day  Pharmacy notes medication will need to be changed due to recall   Jung Estrada

## 2018-08-01 DIAGNOSIS — I10 ESSENTIAL HYPERTENSION: ICD-10-CM

## 2018-08-01 RX ORDER — LOSARTAN POTASSIUM 50 MG/1
50 TABLET ORAL 2 TIMES DAILY
Start: 2018-08-01

## 2018-08-01 NOTE — TELEPHONE ENCOUNTER
Spoke to pharmacist, he stated that 160mg valsartan is equivalent to losartan 50mg  So 100mg Losartan once a day would be the equivalent to the dose she is on  I spoke with Dr Suman Gramajo, she said that is fine and to make sure the pharmacy tells the patient the follow up with her new pcp in [facility] as soon as possible  No further action needed

## 2018-08-01 NOTE — TELEPHONE ENCOUNTER
Rx of losartan done on 7/30/18 called in to [address] [place] [address] in [place]     no further action is required  af/rma

## 2018-08-02 ENCOUNTER — TELEPHONE (OUTPATIENT)
Dept: FAMILY MEDICINE CLINIC | Facility: CLINIC | Age: 83
End: 2018-08-02

## 2018-08-02 NOTE — TELEPHONE ENCOUNTER
Incoming fax requesting need for clarification of recall for Valsartan to send alternative med     prcma

## 2019-02-08 ENCOUNTER — IMPORTED ENCOUNTER (OUTPATIENT)
Dept: URBAN - METROPOLITAN AREA CLINIC 50 | Facility: CLINIC | Age: 84
End: 2019-02-08

## 2019-02-12 ENCOUNTER — IMPORTED ENCOUNTER (OUTPATIENT)
Dept: URBAN - METROPOLITAN AREA CLINIC 50 | Facility: CLINIC | Age: 84
End: 2019-02-12

## 2019-06-27 ENCOUNTER — IMPORTED ENCOUNTER (OUTPATIENT)
Dept: URBAN - METROPOLITAN AREA CLINIC 50 | Facility: CLINIC | Age: 84
End: 2019-06-27

## 2019-07-18 ENCOUNTER — IMPORTED ENCOUNTER (OUTPATIENT)
Dept: URBAN - METROPOLITAN AREA CLINIC 50 | Facility: CLINIC | Age: 84
End: 2019-07-18

## 2019-08-05 ENCOUNTER — IMPORTED ENCOUNTER (OUTPATIENT)
Dept: URBAN - METROPOLITAN AREA CLINIC 50 | Facility: CLINIC | Age: 84
End: 2019-08-05

## 2019-08-14 ENCOUNTER — IMPORTED ENCOUNTER (OUTPATIENT)
Dept: URBAN - METROPOLITAN AREA CLINIC 50 | Facility: CLINIC | Age: 84
End: 2019-08-14

## 2019-08-19 ENCOUNTER — IMPORTED ENCOUNTER (OUTPATIENT)
Dept: URBAN - METROPOLITAN AREA CLINIC 50 | Facility: CLINIC | Age: 84
End: 2019-08-19

## 2019-09-23 ENCOUNTER — IMPORTED ENCOUNTER (OUTPATIENT)
Dept: URBAN - METROPOLITAN AREA CLINIC 50 | Facility: CLINIC | Age: 84
End: 2019-09-23

## 2019-12-16 ENCOUNTER — IMPORTED ENCOUNTER (OUTPATIENT)
Dept: URBAN - METROPOLITAN AREA CLINIC 50 | Facility: CLINIC | Age: 84
End: 2019-12-16

## 2020-12-22 ENCOUNTER — IMPORTED ENCOUNTER (OUTPATIENT)
Dept: URBAN - METROPOLITAN AREA CLINIC 50 | Facility: CLINIC | Age: 85
End: 2020-12-22

## 2021-02-16 ENCOUNTER — IMPORTED ENCOUNTER (OUTPATIENT)
Dept: URBAN - METROPOLITAN AREA CLINIC 50 | Facility: CLINIC | Age: 86
End: 2021-02-16

## 2021-02-16 NOTE — PROCEDURE NOTE: CLINICAL
PROCEDURE NOTE: Punctal Plugs, 0.4 mm Quintess Dissolvable (78284H, ) #1 Left Lower Lid. Prior to treatment, the risks/benefits/alternatives were discussed. The patient wished to proceed with procedure. Temporary collagen plugs were inserted. Patient tolerated procedure well. There were no complications. Post procedure instructions given. Ann-Marie Terry PROCEDURE NOTE: Punctal Plugs, 0.4 mm Quintess Dissolvable (99100V, ) #1 Right Lower Lid. Prior to treatment, the risks/benefits/alternatives were discussed. The patient wished to proceed with procedure. Temporary collagen plugs were inserted. Patient tolerated procedure well. There were no complications. Post procedure instructions given. Ann-Marie Terry

## 2021-04-17 ASSESSMENT — VISUAL ACUITY
OD_PH: 20/70-
OS_CC: 20/70
OS_SC: 20/40-2
OD_CC: 20/200+
OS_PH: 20/30-
OD_BAT: 20/400
OD_CC: J3
OD_SC: 20/400
OD_OTHER: 20/400.
OD_BAT: 20/200
OS_PH: 20/40-
OD_BAT: 20/200
OD_SC: 20/200
OD_CC: 20/200
OS_CC: 20/100+
OS_CC: J3
OD_CC: J2@ 14 IN
OD_SC: 20/200+
OS_SC: 20/30
OS_CC: 20/60-2
OD_CC: 20/80-2
OS_SC: 20/30+-
OS_CC: J2@ 14 IN
OD_OTHER: 20/200. >20/400.
OD_SC: 20/200
OD_OTHER: 20/200. >20/400.
OD_SC: 20/100

## 2021-04-17 ASSESSMENT — TONOMETRY
OD_IOP_MMHG: 16
OS_IOP_MMHG: 15
OS_IOP_MMHG: 15
OD_IOP_MMHG: 17
OS_IOP_MMHG: 16
OD_IOP_MMHG: 16
OD_IOP_MMHG: 14
OS_IOP_MMHG: 17
OS_IOP_MMHG: 14
OS_IOP_MMHG: 15
OD_IOP_MMHG: 14
OD_IOP_MMHG: 15
OD_IOP_MMHG: 14
OD_IOP_MMHG: 16

## 2021-04-17 ASSESSMENT — PACHYMETRY
OD_CT_UM: 544

## 2021-08-25 NOTE — PROCEDURE NOTE: CLINICAL
PROCEDURE NOTE: Punctal Plugs, 0.5 mm Quintess Dissolvable (83099B, ) #2 Left Lower Lid. Prior to treatment, the risks/benefits/alternatives were discussed. The patient wished to proceed with procedure. Temporary collagen plugs were inserted. Patient tolerated procedure well. There were no complications. Post procedure instructions given. Nj Yi PROCEDURE NOTE: Punctal Plugs, 0.5 mm Quintess Dissolvable (99986O, ) #2 Right Lower Lid. Prior to treatment, the risks/benefits/alternatives were discussed. The patient wished to proceed with procedure. Temporary collagen plugs were inserted. Patient tolerated procedure well. There were no complications. Post procedure instructions given. Nj Yi

## 2021-10-14 NOTE — PATIENT DISCUSSION
will start Pred Acetate TID OD x 7-10 days. If no improvement with drops, will refer to Rehan for eval/excision.

## 2021-10-14 NOTE — PATIENT DISCUSSION
Discussed AREDS supplements, BP Control, UV protection and dark leafy green vegetables. Good postoperative appearance.

## 2021-10-25 NOTE — PATIENT DISCUSSION
The patient elects to proceed with surgical excision.  Consent signed and aftercare instructions given.

## 2021-10-25 NOTE — PROCEDURE NOTE: CLINICAL
PROCEDURE NOTE: Excision Chalazion, Single Right Lower Lid. Diagnosis: Chalazion. Anesthesia: Subconjunctival Lidocaine. Prep: Alcohol. Risks, benefits and alternatives were discussed. Patient desires to proceed with excision and drainage of the chalazion today. A drop of proparacaine was instilled in the involved eye. The area was prepped using an alcohol wipe. Tetracaine was applied to the conjunctiva using a cotton tipped applicator. The involved area was anesthetized using 1% lidocaine with epi. A chalazion clamp was place d on the eyelid and the lid was everted revealing the blocked gland. A 15 blade was used to make an incision into the blocked gland. A curette was used to remove the blocked oil. The wall of the sac was removed using mini Ez scissors. Cautery was used to achieve hemostasis. The clamp was removed and erythromycin ointment was instilled. The eye was patched which can be removed in an hour and post op instructions were given. The patient tolerated the procedure well and left in good condition. They understand to call for any concerns. Hali Monroy

## 2021-12-22 ENCOUNTER — PREPPED CHART (OUTPATIENT)
Dept: URBAN - METROPOLITAN AREA CLINIC 49 | Facility: CLINIC | Age: 86
End: 2021-12-22

## 2022-01-26 NOTE — PROCEDURE NOTE: CLINICAL
PROCEDURE NOTE: Punctal Plugs, 0.5 mm Quintess Dissolvable (36360I, ) #3 Left Lower Lid. Prior to treatment, the risks/benefits/alternatives were discussed. The patient wished to proceed with procedure. Temporary collagen plugs were inserted. Patient tolerated procedure well. There were no complications. Post procedure instructions given. Jose Ramon Alvarado PROCEDURE NOTE: Punctal Plugs, 0.5 mm Quintess Dissolvable (07683W, ) #3 Right Lower Lid. Prior to treatment, the risks/benefits/alternatives were discussed. The patient wished to proceed with procedure. Temporary collagen plugs were inserted. Patient tolerated procedure well. There were no complications. Post procedure instructions given. Jose Ramon Alvarado

## 2022-03-04 NOTE — PATIENT DISCUSSION
No retinal detachment or retinal tear noted. PT scheduled next available with Dr. Ragsdale, due to only wanting to be seen at [facility] and [facility] not accepting new patients, per guidelines.  Please review.     Pt stated she will follow up with Dr. Blake regarding additional labs.